# Patient Record
Sex: MALE | Race: BLACK OR AFRICAN AMERICAN | NOT HISPANIC OR LATINO | Employment: OTHER | URBAN - METROPOLITAN AREA
[De-identification: names, ages, dates, MRNs, and addresses within clinical notes are randomized per-mention and may not be internally consistent; named-entity substitution may affect disease eponyms.]

---

## 2021-06-09 PROBLEM — N17.9 ACUTE RENAL FAILURE: Status: ACTIVE | Noted: 2021-06-09

## 2021-07-05 PROBLEM — I65.23 CAROTID STENOSIS, ASYMPTOMATIC, BILATERAL: Status: ACTIVE | Noted: 2021-07-05

## 2021-07-05 PROBLEM — I10 ESSENTIAL HYPERTENSION: Status: ACTIVE | Noted: 2021-07-05

## 2021-07-05 PROBLEM — I65.23 BILATERAL CAROTID ARTERY STENOSIS: Status: ACTIVE | Noted: 2021-07-05

## 2021-07-05 PROBLEM — N18.6 ESRD (END STAGE RENAL DISEASE): Status: ACTIVE | Noted: 2021-07-05

## 2021-07-05 PROBLEM — H49.02 THIRD NERVE PALSY OF LEFT EYE: Status: ACTIVE | Noted: 2021-07-05

## 2021-07-05 PROBLEM — E11.9 TYPE 2 DIABETES MELLITUS, WITHOUT LONG-TERM CURRENT USE OF INSULIN: Status: ACTIVE | Noted: 2021-07-05

## 2021-10-11 PROBLEM — I73.9 PVD (PERIPHERAL VASCULAR DISEASE): Status: ACTIVE | Noted: 2021-10-11

## 2021-11-04 ENCOUNTER — TELEPHONE (OUTPATIENT)
Dept: CARDIOLOGY | Facility: CLINIC | Age: 66
End: 2021-11-04
Payer: MEDICARE

## 2021-11-19 ENCOUNTER — OFFICE VISIT (OUTPATIENT)
Dept: CARDIOLOGY | Facility: CLINIC | Age: 66
End: 2021-11-19
Payer: MEDICARE

## 2021-11-19 VITALS
HEIGHT: 71 IN | BODY MASS INDEX: 27.56 KG/M2 | DIASTOLIC BLOOD PRESSURE: 66 MMHG | WEIGHT: 196.88 LBS | HEART RATE: 101 BPM | SYSTOLIC BLOOD PRESSURE: 144 MMHG

## 2021-11-19 DIAGNOSIS — E11.9 TYPE 2 DIABETES MELLITUS WITHOUT COMPLICATION, WITHOUT LONG-TERM CURRENT USE OF INSULIN: ICD-10-CM

## 2021-11-19 DIAGNOSIS — I10 ESSENTIAL HYPERTENSION: ICD-10-CM

## 2021-11-19 DIAGNOSIS — I73.9 PVD (PERIPHERAL VASCULAR DISEASE): Primary | ICD-10-CM

## 2021-11-19 DIAGNOSIS — N18.6 ESRD (END STAGE RENAL DISEASE): ICD-10-CM

## 2021-11-19 DIAGNOSIS — I65.23 CAROTID STENOSIS, ASYMPTOMATIC, BILATERAL: ICD-10-CM

## 2021-11-19 PROCEDURE — 3044F HG A1C LEVEL LT 7.0%: CPT | Mod: CPTII,S$GLB,, | Performed by: PHYSICIAN ASSISTANT

## 2021-11-19 PROCEDURE — 3008F PR BODY MASS INDEX (BMI) DOCUMENTED: ICD-10-PCS | Mod: CPTII,S$GLB,, | Performed by: PHYSICIAN ASSISTANT

## 2021-11-19 PROCEDURE — 1159F PR MEDICATION LIST DOCUMENTED IN MEDICAL RECORD: ICD-10-PCS | Mod: CPTII,S$GLB,, | Performed by: PHYSICIAN ASSISTANT

## 2021-11-19 PROCEDURE — 3044F PR MOST RECENT HEMOGLOBIN A1C LEVEL <7.0%: ICD-10-PCS | Mod: CPTII,S$GLB,, | Performed by: PHYSICIAN ASSISTANT

## 2021-11-19 PROCEDURE — 3078F DIAST BP <80 MM HG: CPT | Mod: CPTII,S$GLB,, | Performed by: PHYSICIAN ASSISTANT

## 2021-11-19 PROCEDURE — 3066F NEPHROPATHY DOC TX: CPT | Mod: CPTII,S$GLB,, | Performed by: PHYSICIAN ASSISTANT

## 2021-11-19 PROCEDURE — 99999 PR PBB SHADOW E&M-EST. PATIENT-LVL III: ICD-10-PCS | Mod: PBBFAC,,, | Performed by: PHYSICIAN ASSISTANT

## 2021-11-19 PROCEDURE — 3077F SYST BP >= 140 MM HG: CPT | Mod: CPTII,S$GLB,, | Performed by: PHYSICIAN ASSISTANT

## 2021-11-19 PROCEDURE — 3077F PR MOST RECENT SYSTOLIC BLOOD PRESSURE >= 140 MM HG: ICD-10-PCS | Mod: CPTII,S$GLB,, | Performed by: PHYSICIAN ASSISTANT

## 2021-11-19 PROCEDURE — 99214 OFFICE O/P EST MOD 30 MIN: CPT | Mod: S$GLB,,, | Performed by: PHYSICIAN ASSISTANT

## 2021-11-19 PROCEDURE — 1159F MED LIST DOCD IN RCRD: CPT | Mod: CPTII,S$GLB,, | Performed by: PHYSICIAN ASSISTANT

## 2021-11-19 PROCEDURE — 99214 PR OFFICE/OUTPT VISIT, EST, LEVL IV, 30-39 MIN: ICD-10-PCS | Mod: S$GLB,,, | Performed by: PHYSICIAN ASSISTANT

## 2021-11-19 PROCEDURE — 1160F RVW MEDS BY RX/DR IN RCRD: CPT | Mod: CPTII,S$GLB,, | Performed by: PHYSICIAN ASSISTANT

## 2021-11-19 PROCEDURE — 99999 PR PBB SHADOW E&M-EST. PATIENT-LVL III: CPT | Mod: PBBFAC,,, | Performed by: PHYSICIAN ASSISTANT

## 2021-11-19 PROCEDURE — 3008F BODY MASS INDEX DOCD: CPT | Mod: CPTII,S$GLB,, | Performed by: PHYSICIAN ASSISTANT

## 2021-11-19 PROCEDURE — 3066F PR DOCUMENTATION OF TREATMENT FOR NEPHROPATHY: ICD-10-PCS | Mod: CPTII,S$GLB,, | Performed by: PHYSICIAN ASSISTANT

## 2021-11-19 PROCEDURE — 1160F PR REVIEW ALL MEDS BY PRESCRIBER/CLIN PHARMACIST DOCUMENTED: ICD-10-PCS | Mod: CPTII,S$GLB,, | Performed by: PHYSICIAN ASSISTANT

## 2021-11-19 PROCEDURE — 3078F PR MOST RECENT DIASTOLIC BLOOD PRESSURE < 80 MM HG: ICD-10-PCS | Mod: CPTII,S$GLB,, | Performed by: PHYSICIAN ASSISTANT

## 2021-11-19 RX ORDER — GABAPENTIN 300 MG/1
300 CAPSULE ORAL NIGHTLY
Qty: 90 CAPSULE | Refills: 3 | Status: SHIPPED | OUTPATIENT
Start: 2021-11-19 | End: 2022-11-19

## 2021-11-22 PROBLEM — M86.9 OSTEOMYELITIS OF GREAT TOE OF LEFT FOOT: Status: ACTIVE | Noted: 2021-11-22

## 2021-11-22 PROBLEM — Z71.89 ACP (ADVANCE CARE PLANNING): Status: ACTIVE | Noted: 2021-11-22

## 2021-11-29 PROBLEM — D63.8 ANEMIA OF CHRONIC DISEASE: Status: ACTIVE | Noted: 2021-11-29

## 2022-09-30 PROBLEM — L03.031 CELLULITIS OF GREAT TOE OF RIGHT FOOT: Status: ACTIVE | Noted: 2022-09-30

## 2022-09-30 PROBLEM — N25.81 SECONDARY HYPERPARATHYROIDISM: Status: ACTIVE | Noted: 2022-09-30

## 2022-10-17 PROBLEM — Z75.8 DISCHARGE PLANNING ISSUES: Status: ACTIVE | Noted: 2022-10-17

## 2024-07-03 ENCOUNTER — ANESTHESIA EVENT (OUTPATIENT)
Dept: CARDIOLOGY | Facility: HOSPITAL | Age: 69
End: 2024-07-03
Payer: MEDICARE

## 2024-07-03 ENCOUNTER — ANESTHESIA (OUTPATIENT)
Dept: CARDIOLOGY | Facility: HOSPITAL | Age: 69
End: 2024-07-03
Payer: MEDICARE

## 2024-07-03 ENCOUNTER — HOSPITAL ENCOUNTER (INPATIENT)
Facility: HOSPITAL | Age: 69
LOS: 5 days | Discharge: HOME HEALTH | DRG: 291 | End: 2024-07-08
Attending: FAMILY MEDICINE | Admitting: HOSPITALIST
Payer: MEDICARE

## 2024-07-03 VITALS
OXYGEN SATURATION: 99 % | HEART RATE: 62 BPM | SYSTOLIC BLOOD PRESSURE: 107 MMHG | DIASTOLIC BLOOD PRESSURE: 59 MMHG | RESPIRATION RATE: 25 BRPM

## 2024-07-03 DIAGNOSIS — I33.0 ENDOCARDITIS, BACTERIAL, ACUTE/SUBACUTE: ICD-10-CM

## 2024-07-03 DIAGNOSIS — R07.9 CHEST PAIN: ICD-10-CM

## 2024-07-03 DIAGNOSIS — I38 ENDOCARDITIS: ICD-10-CM

## 2024-07-03 DIAGNOSIS — I33.0 BACTERIAL ENDOCARDITIS: Primary | ICD-10-CM

## 2024-07-03 DIAGNOSIS — N18.6 ESRD (END STAGE RENAL DISEASE): ICD-10-CM

## 2024-07-03 DIAGNOSIS — I33.0 SBE (SUBACUTE BACTERIAL ENDOCARDITIS): ICD-10-CM

## 2024-07-03 PROBLEM — Z99.2 ANEMIA IN CHRONIC KIDNEY DISEASE, ON CHRONIC DIALYSIS: Status: ACTIVE | Noted: 2021-11-29

## 2024-07-03 PROBLEM — T87.9 BKA STUMP COMPLICATION: Status: ACTIVE | Noted: 2024-07-03

## 2024-07-03 PROBLEM — J96.01 ACUTE HYPOXIC RESPIRATORY FAILURE: Status: ACTIVE | Noted: 2024-07-03

## 2024-07-03 PROBLEM — D63.1 ANEMIA IN CHRONIC KIDNEY DISEASE, ON CHRONIC DIALYSIS: Status: ACTIVE | Noted: 2021-11-29

## 2024-07-03 PROBLEM — I50.21 ACUTE SYSTOLIC HEART FAILURE: Status: ACTIVE | Noted: 2024-07-03

## 2024-07-03 LAB
ALBUMIN SERPL BCP-MCNC: 2.9 G/DL (ref 3.5–5.2)
ALBUMIN SERPL BCP-MCNC: 3.1 G/DL (ref 3.5–5.2)
ALP SERPL-CCNC: 202 U/L (ref 55–135)
ALP SERPL-CCNC: 212 U/L (ref 55–135)
ALT SERPL W/O P-5'-P-CCNC: 27 U/L (ref 10–44)
ALT SERPL W/O P-5'-P-CCNC: 30 U/L (ref 10–44)
ANION GAP SERPL CALC-SCNC: 13 MMOL/L (ref 8–16)
ANION GAP SERPL CALC-SCNC: 16 MMOL/L (ref 8–16)
AST SERPL-CCNC: 21 U/L (ref 10–40)
AST SERPL-CCNC: 22 U/L (ref 10–40)
BASOPHILS # BLD AUTO: 0.04 K/UL (ref 0–0.2)
BASOPHILS NFR BLD: 0.7 % (ref 0–1.9)
BILIRUB SERPL-MCNC: 0.7 MG/DL (ref 0.1–1)
BILIRUB SERPL-MCNC: 0.7 MG/DL (ref 0.1–1)
BSA FOR ECHO PROCEDURE: 1.52 M2
BUN SERPL-MCNC: 42 MG/DL (ref 8–23)
BUN SERPL-MCNC: 44 MG/DL (ref 8–23)
CALCIUM SERPL-MCNC: 8.9 MG/DL (ref 8.7–10.5)
CALCIUM SERPL-MCNC: 8.9 MG/DL (ref 8.7–10.5)
CHLORIDE SERPL-SCNC: 102 MMOL/L (ref 95–110)
CHLORIDE SERPL-SCNC: 102 MMOL/L (ref 95–110)
CO2 SERPL-SCNC: 18 MMOL/L (ref 23–29)
CO2 SERPL-SCNC: 19 MMOL/L (ref 23–29)
CREAT SERPL-MCNC: 7.8 MG/DL (ref 0.5–1.4)
CREAT SERPL-MCNC: 7.9 MG/DL (ref 0.5–1.4)
DIFFERENTIAL METHOD BLD: ABNORMAL
DOP CALC MV VTI: 119.6 CM
EOSINOPHIL # BLD AUTO: 0.2 K/UL (ref 0–0.5)
EOSINOPHIL NFR BLD: 3 % (ref 0–8)
ERYTHROCYTE [DISTWIDTH] IN BLOOD BY AUTOMATED COUNT: 18.9 % (ref 11.5–14.5)
EST. GFR  (NO RACE VARIABLE): 7 ML/MIN/1.73 M^2
EST. GFR  (NO RACE VARIABLE): 7 ML/MIN/1.73 M^2
GLUCOSE SERPL-MCNC: 111 MG/DL (ref 70–110)
GLUCOSE SERPL-MCNC: 119 MG/DL (ref 70–110)
HCT VFR BLD AUTO: 36.2 % (ref 40–54)
HGB BLD-MCNC: 11.3 G/DL (ref 14–18)
IMM GRANULOCYTES # BLD AUTO: 0.02 K/UL (ref 0–0.04)
IMM GRANULOCYTES NFR BLD AUTO: 0.4 % (ref 0–0.5)
LYMPHOCYTES # BLD AUTO: 1.2 K/UL (ref 1–4.8)
LYMPHOCYTES NFR BLD: 21.5 % (ref 18–48)
MAGNESIUM SERPL-MCNC: 2.4 MG/DL (ref 1.6–2.6)
MAGNESIUM SERPL-MCNC: 2.4 MG/DL (ref 1.6–2.6)
MCH RBC QN AUTO: 30.1 PG (ref 27–31)
MCHC RBC AUTO-ENTMCNC: 31.2 G/DL (ref 32–36)
MCV RBC AUTO: 97 FL (ref 82–98)
MONOCYTES # BLD AUTO: 0.6 K/UL (ref 0.3–1)
MONOCYTES NFR BLD: 10.9 % (ref 4–15)
MR PISA EROA: 0.22 CM2
MV MEAN GRADIENT: 27 MMHG
MV PEAK GRADIENT: 41 MMHG
NEUTROPHILS # BLD AUTO: 3.6 K/UL (ref 1.8–7.7)
NEUTROPHILS NFR BLD: 63.5 % (ref 38–73)
NRBC BLD-RTO: 1 /100 WBC
PHOSPHATE SERPL-MCNC: 5.3 MG/DL (ref 2.7–4.5)
PISA MRMAX VEL: 3.18 M/S
PISA RADIUS: 0.54 CM
PISA VN NYQUIST MS: 0.39 M/S
PISA VN NYQUIST: 0.39 M/S
PLATELET # BLD AUTO: 203 K/UL (ref 150–450)
PMV BLD AUTO: 10.5 FL (ref 9.2–12.9)
POCT GLUCOSE: 103 MG/DL (ref 70–110)
POCT GLUCOSE: 122 MG/DL (ref 70–110)
POCT GLUCOSE: 124 MG/DL (ref 70–110)
POTASSIUM SERPL-SCNC: 4.9 MMOL/L (ref 3.5–5.1)
POTASSIUM SERPL-SCNC: 5 MMOL/L (ref 3.5–5.1)
PROCALCITONIN SERPL IA-MCNC: 1.43 NG/ML
PROT SERPL-MCNC: 6.9 G/DL (ref 6–8.4)
PROT SERPL-MCNC: 7 G/DL (ref 6–8.4)
RBC # BLD AUTO: 3.75 M/UL (ref 4.6–6.2)
SODIUM SERPL-SCNC: 133 MMOL/L (ref 136–145)
SODIUM SERPL-SCNC: 137 MMOL/L (ref 136–145)
VANCOMYCIN SERPL-MCNC: 14.9 UG/ML
VANCOMYCIN SERPL-MCNC: 15.6 UG/ML
WBC # BLD AUTO: 5.62 K/UL (ref 3.9–12.7)

## 2024-07-03 PROCEDURE — 5A1D70Z PERFORMANCE OF URINARY FILTRATION, INTERMITTENT, LESS THAN 6 HOURS PER DAY: ICD-10-PCS | Performed by: STUDENT IN AN ORGANIZED HEALTH CARE EDUCATION/TRAINING PROGRAM

## 2024-07-03 PROCEDURE — 25000003 PHARM REV CODE 250: Performed by: FAMILY MEDICINE

## 2024-07-03 PROCEDURE — 83735 ASSAY OF MAGNESIUM: CPT | Mod: 91 | Performed by: FAMILY MEDICINE

## 2024-07-03 PROCEDURE — 25000003 PHARM REV CODE 250: Performed by: NURSE ANESTHETIST, CERTIFIED REGISTERED

## 2024-07-03 PROCEDURE — 36415 COLL VENOUS BLD VENIPUNCTURE: CPT | Performed by: FAMILY MEDICINE

## 2024-07-03 PROCEDURE — 11000001 HC ACUTE MED/SURG PRIVATE ROOM

## 2024-07-03 PROCEDURE — 37000009 HC ANESTHESIA EA ADD 15 MINS: Performed by: INTERNAL MEDICINE

## 2024-07-03 PROCEDURE — 63600175 PHARM REV CODE 636 W HCPCS: Performed by: FAMILY MEDICINE

## 2024-07-03 PROCEDURE — 80100016 HC MAINTENANCE HEMODIALYSIS

## 2024-07-03 PROCEDURE — 84145 PROCALCITONIN (PCT): CPT | Performed by: FAMILY MEDICINE

## 2024-07-03 PROCEDURE — 80053 COMPREHEN METABOLIC PANEL: CPT | Mod: 91 | Performed by: FAMILY MEDICINE

## 2024-07-03 PROCEDURE — 80202 ASSAY OF VANCOMYCIN: CPT | Performed by: HOSPITALIST

## 2024-07-03 PROCEDURE — 80202 ASSAY OF VANCOMYCIN: CPT | Mod: 91 | Performed by: FAMILY MEDICINE

## 2024-07-03 PROCEDURE — 37000008 HC ANESTHESIA 1ST 15 MINUTES: Performed by: INTERNAL MEDICINE

## 2024-07-03 PROCEDURE — 85025 COMPLETE CBC W/AUTO DIFF WBC: CPT | Performed by: FAMILY MEDICINE

## 2024-07-03 PROCEDURE — 84100 ASSAY OF PHOSPHORUS: CPT | Performed by: FAMILY MEDICINE

## 2024-07-03 PROCEDURE — 27000221 HC OXYGEN, UP TO 24 HOURS

## 2024-07-03 PROCEDURE — 87040 BLOOD CULTURE FOR BACTERIA: CPT | Mod: 59 | Performed by: FAMILY MEDICINE

## 2024-07-03 PROCEDURE — 97162 PT EVAL MOD COMPLEX 30 MIN: CPT | Performed by: PHYSICAL THERAPIST

## 2024-07-03 PROCEDURE — 63600175 PHARM REV CODE 636 W HCPCS: Performed by: NURSE ANESTHETIST, CERTIFIED REGISTERED

## 2024-07-03 PROCEDURE — 99222 1ST HOSP IP/OBS MODERATE 55: CPT | Mod: 25,GC,, | Performed by: NURSE PRACTITIONER

## 2024-07-03 PROCEDURE — 25000003 PHARM REV CODE 250: Performed by: STUDENT IN AN ORGANIZED HEALTH CARE EDUCATION/TRAINING PROGRAM

## 2024-07-03 PROCEDURE — 97530 THERAPEUTIC ACTIVITIES: CPT | Performed by: PHYSICAL THERAPIST

## 2024-07-03 RX ORDER — ETOMIDATE 2 MG/ML
INJECTION INTRAVENOUS
Status: DISCONTINUED | OUTPATIENT
Start: 2024-07-03 | End: 2024-07-03

## 2024-07-03 RX ORDER — NALOXONE HCL 0.4 MG/ML
0.02 VIAL (ML) INJECTION
Status: DISCONTINUED | OUTPATIENT
Start: 2024-07-03 | End: 2024-07-08 | Stop reason: HOSPADM

## 2024-07-03 RX ORDER — MUPIROCIN 20 MG/G
OINTMENT TOPICAL 2 TIMES DAILY
Status: DISPENSED | OUTPATIENT
Start: 2024-07-03 | End: 2024-07-08

## 2024-07-03 RX ORDER — AMLODIPINE BESYLATE 5 MG/1
10 TABLET ORAL DAILY
Status: DISCONTINUED | OUTPATIENT
Start: 2024-07-03 | End: 2024-07-08 | Stop reason: HOSPADM

## 2024-07-03 RX ORDER — MINOXIDIL 2.5 MG/1
5 TABLET ORAL 2 TIMES DAILY
Status: DISCONTINUED | OUTPATIENT
Start: 2024-07-03 | End: 2024-07-08 | Stop reason: HOSPADM

## 2024-07-03 RX ORDER — LIDOCAINE HYDROCHLORIDE 20 MG/ML
INJECTION, SOLUTION EPIDURAL; INFILTRATION; INTRACAUDAL; PERINEURAL
Status: DISCONTINUED | OUTPATIENT
Start: 2024-07-03 | End: 2024-07-03

## 2024-07-03 RX ORDER — SODIUM CHLORIDE 9 MG/ML
INJECTION, SOLUTION INTRAVENOUS ONCE
Status: DISCONTINUED | OUTPATIENT
Start: 2024-07-03 | End: 2024-07-08 | Stop reason: HOSPADM

## 2024-07-03 RX ORDER — IBUPROFEN 200 MG
24 TABLET ORAL
Status: DISCONTINUED | OUTPATIENT
Start: 2024-07-03 | End: 2024-07-08 | Stop reason: HOSPADM

## 2024-07-03 RX ORDER — HEPARIN SODIUM 5000 [USP'U]/ML
5000 INJECTION, SOLUTION INTRAVENOUS; SUBCUTANEOUS EVERY 8 HOURS
Status: DISCONTINUED | OUTPATIENT
Start: 2024-07-03 | End: 2024-07-08 | Stop reason: HOSPADM

## 2024-07-03 RX ORDER — FAMOTIDINE 20 MG/1
40 TABLET, FILM COATED ORAL NIGHTLY
Status: DISCONTINUED | OUTPATIENT
Start: 2024-07-03 | End: 2024-07-05

## 2024-07-03 RX ORDER — CARVEDILOL 25 MG/1
25 TABLET ORAL 2 TIMES DAILY
Status: DISCONTINUED | OUTPATIENT
Start: 2024-07-03 | End: 2024-07-08 | Stop reason: HOSPADM

## 2024-07-03 RX ORDER — PROPOFOL 10 MG/ML
VIAL (ML) INTRAVENOUS
Status: DISCONTINUED | OUTPATIENT
Start: 2024-07-03 | End: 2024-07-03

## 2024-07-03 RX ORDER — GLUCAGON 1 MG
1 KIT INJECTION
Status: DISCONTINUED | OUTPATIENT
Start: 2024-07-03 | End: 2024-07-08 | Stop reason: HOSPADM

## 2024-07-03 RX ORDER — SODIUM CHLORIDE 0.9 % (FLUSH) 0.9 %
10 SYRINGE (ML) INJECTION EVERY 12 HOURS PRN
Status: DISCONTINUED | OUTPATIENT
Start: 2024-07-03 | End: 2024-07-08 | Stop reason: HOSPADM

## 2024-07-03 RX ORDER — SEVELAMER CARBONATE 800 MG/1
800 TABLET, FILM COATED ORAL
Status: DISCONTINUED | OUTPATIENT
Start: 2024-07-03 | End: 2024-07-08 | Stop reason: HOSPADM

## 2024-07-03 RX ORDER — CLOPIDOGREL BISULFATE 75 MG/1
75 TABLET ORAL DAILY
Status: DISCONTINUED | OUTPATIENT
Start: 2024-07-03 | End: 2024-07-08 | Stop reason: HOSPADM

## 2024-07-03 RX ORDER — ATORVASTATIN CALCIUM 40 MG/1
80 TABLET, FILM COATED ORAL EVERY MORNING
Status: DISCONTINUED | OUTPATIENT
Start: 2024-07-03 | End: 2024-07-08 | Stop reason: HOSPADM

## 2024-07-03 RX ORDER — ASPIRIN 81 MG/1
81 TABLET ORAL EVERY OTHER DAY
Status: DISCONTINUED | OUTPATIENT
Start: 2024-07-03 | End: 2024-07-08 | Stop reason: HOSPADM

## 2024-07-03 RX ORDER — PHENYLEPHRINE HYDROCHLORIDE 10 MG/ML
INJECTION INTRAVENOUS
Status: DISCONTINUED | OUTPATIENT
Start: 2024-07-03 | End: 2024-07-03

## 2024-07-03 RX ORDER — SODIUM CHLORIDE 9 MG/ML
INJECTION, SOLUTION INTRAVENOUS
Status: DISCONTINUED | OUTPATIENT
Start: 2024-07-03 | End: 2024-07-08 | Stop reason: HOSPADM

## 2024-07-03 RX ORDER — IBUPROFEN 200 MG
16 TABLET ORAL
Status: DISCONTINUED | OUTPATIENT
Start: 2024-07-03 | End: 2024-07-08 | Stop reason: HOSPADM

## 2024-07-03 RX ORDER — ACETAMINOPHEN 325 MG/1
650 TABLET ORAL EVERY 4 HOURS PRN
Status: DISCONTINUED | OUTPATIENT
Start: 2024-07-03 | End: 2024-07-08 | Stop reason: HOSPADM

## 2024-07-03 RX ADMIN — HEPARIN SODIUM 5000 UNITS: 5000 INJECTION INTRAVENOUS; SUBCUTANEOUS at 05:07

## 2024-07-03 RX ADMIN — PROPOFOL 10 MG: 10 INJECTION, EMULSION INTRAVENOUS at 12:07

## 2024-07-03 RX ADMIN — LIDOCAINE HYDROCHLORIDE 40 MG: 20 INJECTION, SOLUTION EPIDURAL; INFILTRATION; INTRACAUDAL; PERINEURAL at 01:07

## 2024-07-03 RX ADMIN — MUPIROCIN: 20 OINTMENT TOPICAL at 08:07

## 2024-07-03 RX ADMIN — CARVEDILOL 25 MG: 25 TABLET, FILM COATED ORAL at 08:07

## 2024-07-03 RX ADMIN — CLOPIDOGREL BISULFATE 75 MG: 75 TABLET ORAL at 08:07

## 2024-07-03 RX ADMIN — PHENYLEPHRINE HYDROCHLORIDE 200 MCG: 10 INJECTION INTRAVENOUS at 01:07

## 2024-07-03 RX ADMIN — PHENYLEPHRINE HYDROCHLORIDE 200 MCG: 10 INJECTION INTRAVENOUS at 12:07

## 2024-07-03 RX ADMIN — MINOXIDIL 5 MG: 2.5 TABLET ORAL at 09:07

## 2024-07-03 RX ADMIN — ASPIRIN 81 MG: 81 TABLET, COATED ORAL at 08:07

## 2024-07-03 RX ADMIN — LIDOCAINE HYDROCHLORIDE 100 MG: 20 INJECTION, SOLUTION EPIDURAL; INFILTRATION; INTRACAUDAL; PERINEURAL at 12:07

## 2024-07-03 RX ADMIN — MINOXIDIL 5 MG: 2.5 TABLET ORAL at 08:07

## 2024-07-03 RX ADMIN — PHENYLEPHRINE HYDROCHLORIDE 100 MCG: 10 INJECTION INTRAVENOUS at 12:07

## 2024-07-03 RX ADMIN — CARVEDILOL 25 MG: 25 TABLET, FILM COATED ORAL at 09:07

## 2024-07-03 RX ADMIN — LIDOCAINE HYDROCHLORIDE 40 MG: 20 INJECTION, SOLUTION EPIDURAL; INFILTRATION; INTRACAUDAL; PERINEURAL at 12:07

## 2024-07-03 RX ADMIN — MUPIROCIN: 20 OINTMENT TOPICAL at 09:07

## 2024-07-03 RX ADMIN — FAMOTIDINE 40 MG: 20 TABLET ORAL at 09:07

## 2024-07-03 RX ADMIN — ETOMIDATE 2 MG: 2 INJECTION, SOLUTION INTRAVENOUS at 01:07

## 2024-07-03 RX ADMIN — PHENYLEPHRINE HYDROCHLORIDE 100 MCG: 10 INJECTION INTRAVENOUS at 01:07

## 2024-07-03 RX ADMIN — ETOMIDATE 1 MG: 2 INJECTION, SOLUTION INTRAVENOUS at 12:07

## 2024-07-03 RX ADMIN — PROPOFOL 20 MG: 10 INJECTION, EMULSION INTRAVENOUS at 12:07

## 2024-07-03 RX ADMIN — AMLODIPINE BESYLATE 10 MG: 5 TABLET ORAL at 08:07

## 2024-07-03 RX ADMIN — GLYCOPYRROLATE 0.1 MG: 0.2 INJECTION, SOLUTION INTRAMUSCULAR; INTRAVITREAL at 12:07

## 2024-07-03 RX ADMIN — ETOMIDATE 1 MG: 2 INJECTION, SOLUTION INTRAVENOUS at 01:07

## 2024-07-03 RX ADMIN — SODIUM CHLORIDE: 0.9 INJECTION, SOLUTION INTRAVENOUS at 12:07

## 2024-07-03 RX ADMIN — ETOMIDATE 4 MG: 2 INJECTION, SOLUTION INTRAVENOUS at 12:07

## 2024-07-03 RX ADMIN — HEPARIN SODIUM 5000 UNITS: 5000 INJECTION INTRAVENOUS; SUBCUTANEOUS at 09:07

## 2024-07-03 NOTE — ASSESSMENT & PLAN NOTE
- normotensive on arrival  - continue current regimen  - will defer to renal for overall BP changes  - home regimen included clonidine. Per chart review, he was well controlled on current regimen. Will discontinue for now. Can consider if evidence of rebound hypertension

## 2024-07-03 NOTE — HPI
68 y.o. male with a past medical history of CAD (5 vessel CABG 2005), PVD (bilateral BKA) ESRD (HD MWF, via fistula), history of C. dif who presents as a transfer due to concern for endocarditis. He was admitted to [facility] on 6/30 for shortness of breath. As part of his work-up, found to have new systolic heart failure (EF 30-35%) with possible vegetation on the mitral valve. Admission blood cultures at OSH were positive for Acinetobacter and CONS (per transfer note). Of note, troponin and BNP were elevated on admission. Cardiology consulted at OSH and felt it was due to demand ischemia. A KURTIS was recommended, so he was transferred to [facility]. D-dimer elevated at OSH, CTPE performed without evidence of embolism.     Overall, he feels his symptoms have improved since admission. He initially felt SOB on the day of admission, that has since improved after receiving dialysis at OSH. Remains on 2L of [place]. No chest pain, no nausea, no subjective fevers/kills, no diarrhea.

## 2024-07-03 NOTE — BRIEF OP NOTE
Procedure performed:   RENAY     Indication of the procedure:  concern  infective endocarditis        Description of the procedure:     The patient had sedation using IV propofol under Anesthesiology Department.  Once the patient was sedated renay probe was passed   Down to around 35 cm multiple images were acquired in the middle serial mid gastric and deep gastric views. Spectral and  color-flow Doppler imaging were performed.  A bubble study was attempted twice  as a part of this procedure. Once all the images were acquired the RENAY probe was withdrawn without any complications.          Findings:       No definitive evidence of infective endocarditis or assessment of mitral, aortic, tricuspid, pulmonic valves   There is presence of possible calcified nodule measuring 1 x 0.5 cm on the posterior leaflet of the mitral valve associated with mild  mitral regurgitation with Ero of 0.2 cm2 and regurgitant volume of 26 mL.   No evidence of thrombus in left atrial appendage   Severely reduced ejection fraction with EF of around 30-35% with diffuse severe global hypokinesis.  Feels are foreshortened for accurate assessment of ejection fraction   Mild-to-moderate tricuspid regurgitation   Mild pulmonic insufficiency   Moderate diffuse atherosclerosis of descending aorta   Right-sided pleural effusion   No significant pericardial effusion   Trileaflet aortic valve with mild aortic stenosis and degenerative aortic valve

## 2024-07-03 NOTE — PT/OT/SLP PROGRESS
Occupational Therapy      Patient Name:  Alexis Aponte   MRN:  [id number]    2:06 PM Patient not seen today secondary to  (FLOYD in Cath Lab and then to HD per nsg). Will follow-up as able.    7/3/2024

## 2024-07-03 NOTE — CONSULTS
[facility] - Telemetry  Cardiology  Consult Note    Patient Name: Alexis Aponte  MRN: [id number]  Admission Date: 7/3/2024  Hospital Length of Stay: 0 days  Code Status: Full Code   Attending Provider: Bert Moore MD   Consulting Provider: CHARI Barger ANP  Primary Care Physician: Richie Cherry MD  Principal Problem:Bacterial endocarditis    Patient information was obtained from patient, past medical records, and ER records.     Cardiology-[facility]  Consult performed by: Viridiana Tolentino APRN, [place]  Consult ordered by: Tonny Velásquez MD  Reason for consult: KURTIS for endocarditis rule out        Subjective:     Chief Complaint:  SOB        HPI:   67yo male with CAD s/p 5V CABG 2005, PAD s/p bilateral BKA, ESRD on HD, HTN, HLP, DMII, AOCD, acute systolic heart failure and bilateral carotid stenosis who was transferred from [facility] for KURTIS. He was seen on rounds with Dr. Toth and Dr. Bach. He presented to the ER via EMS on 6/30/2024 with complaints of SOB. He reported non productive cough and diarrhea 4 days prior to admission then began feeling SOB that he reported progressively worsened. He reported it started occurring with lying flat therefore he called EMS and was brought to the ER for evaluation. He does HD M-W-F and denied any missed HD. He was emergently dialyzed and admitted with improvement of his SOB. He underwent echocardiogram than demonstrated LVEF 30-35% and 7x10mm mobile mass to MV concerning for vegetation vs degenerative process. Blood cultures positive for Acinetobacter and CONS and was on IV abx. Transferred to [facility] for KURTIS evaluation     Past Medical History:   Diagnosis Date    Anticoagulant long-term use     Carotid stenosis     Coronary artery disease     Diabetes mellitus     diet controlled    Dialysis patient     Disorder of kidney and ureter     ESRD (end stage renal disease)     GERD (gastroesophageal reflux disease)     Heart failure      Hyperlipidemia     Hypertension     PVD (peripheral vascular disease)     Sleep apnea     np cpap       Past Surgical History:   Procedure Laterality Date    5-bypass heart surgery      AORTOGRAPHY WITH EXTREMITY RUNOFF Right 10/11/2021    Procedure: AORTOGRAM, WITH EXTREMITY RUNOFF;  Surgeon: Richie Cherry MD;  Location: [facility] CATH;  Service: General;  Laterality: Right;    AORTOGRAPHY WITH EXTREMITY RUNOFF N/A 5/20/2022    Procedure: AORTOGRAM, WITH EXTREMITY RUNOFF;  Surgeon: Richie Cherry MD;  Location: [facility] CATH;  Service: General;  Laterality: N/A;    AORTOGRAPHY WITH EXTREMITY RUNOFF Left 10/3/2022    Procedure: AORTOGRAM, WITH EXTREMITY RUNOFF;  Surgeon: Abdirashid Aparicio MD;  Location: [facility] CATH;  Service: Cardiology;  Laterality: Left;    APPENDECTOMY      BELOW KNEE AMPUTATION OF LOWER EXTREMITY Left 12/4/2021    Procedure: AMPUTATION, BELOW KNEE;  Surgeon: Richie Cherry MD;  Location: [facility] OR;  Service: General;  Laterality: Left;    BELOW KNEE AMPUTATION OF LOWER EXTREMITY Right 10/12/2022    Procedure: AMPUTATION, BELOW KNEE;  Surgeon: Richie Cherry MD;  Location: [facility] OR;  Service: General;  Laterality: Right;    HERNIA REPAIR      mass on kidney      MEDIPORT REMOVAL N/A 11/27/2021    Procedure: REMOVAL, CATHETER, CENTRAL VENOUS, TUNNELED, WITH PORT;  Surgeon: Sharad Fleming MD;  Location: [facility] OR;  Service: General;  Laterality: N/A;    TOE AMPUTATION Left 11/27/2021    Procedure: AMPUTATION, TOE;  Surgeon: Kobe Snyder DPM;  Location: [facility] OR;  Service: Podiatry;  Laterality: Left;       Review of patient's allergies indicates:  No Known Allergies    No current facility-administered medications on file prior to encounter.     Current Outpatient Medications on File Prior to Encounter   Medication Sig    acetaminophen (TYLENOL) 325 MG tablet Take 1 tablet (325 mg total) by mouth every 6 (six) hours as needed for Pain.    aspirin (ECOTRIN) 81 MG EC tablet Take 1 tablet  (81 mg total) by mouth every other day.    atorvastatin (LIPITOR) 80 MG tablet Take 1 tablet (80 mg total) by mouth every morning.    calcitRIOL (ROCALTROL) 0.5 MCG Cap Take 1 capsule (0.5 mcg total) by mouth once daily.    carvediloL (COREG) 25 MG tablet Take 1 tablet (25 mg total) by mouth 2 (two) times daily.    clopidogreL (PLAVIX) 75 mg tablet Take 1 tablet (75 mg total) by mouth once daily.    famotidine (PEPCID) 40 MG tablet Take 1 tablet (40 mg total) by mouth every evening. (Patient taking differently: Take 40 mg by mouth every evening.)    gabapentin (NEURONTIN) 300 MG capsule Take 1 capsule (300 mg total) by mouth every evening. (Patient taking differently: Take 300 mg by mouth every evening.)    minoxidiL (LONITEN) 2.5 MG tablet Take 2 tablets (5 mg total) by mouth 2 (two) times daily.    sevelamer carbonate (RENVELA) 800 mg Tab Take 1 tablet (800 mg total) by mouth 3 (three) times daily with meals.    polyethylene glycol (GLYCOLAX) 17 gram PwPk Take 17 g by mouth once daily.     Family History       Problem Relation (Age of Onset)    Cancer Sister, Brother          Tobacco Use    Smoking status: Former    Smokeless tobacco: Never   Substance and Sexual Activity    Alcohol use: Yes     Comment: 1 per week    Drug use: Yes     Types: Marijuana    Sexual activity: Not on file     Review of Systems   Constitutional: Negative for chills, decreased appetite, diaphoresis, fever, malaise/fatigue, weight gain and weight loss.   Cardiovascular:  Positive for dyspnea on exertion. Negative for chest pain, claudication, irregular heartbeat, leg swelling, near-syncope, orthopnea, palpitations and paroxysmal nocturnal dyspnea.   Respiratory:  Negative for cough, shortness of breath, snoring, sputum production and wheezing.    Endocrine: Negative for cold intolerance, heat intolerance, polydipsia, polyphagia and polyuria.   Skin:  Negative for color change, dry skin, itching, nail changes and poor wound healing.    Musculoskeletal:  Negative for back pain, gout, joint pain and joint swelling.   Gastrointestinal:  Negative for bloating, abdominal pain, constipation, diarrhea, hematemesis, hematochezia, melena, nausea and vomiting.   Genitourinary:  Negative for dysuria, hematuria and nocturia.   Neurological:  Negative for dizziness, headaches, light-headedness, numbness, paresthesias and weakness.   Psychiatric/Behavioral:  Negative for altered mental status, depression and memory loss.      Objective:     Vital Signs (Most Recent):  Temp: 97.8 °F (36.6 °C) (07/03/24 0724)  Pulse: 65 (07/03/24 0724)  Resp: 18 (07/03/24 0724)  BP: (!) 164/90 (07/03/24 0724)  SpO2: 96 % (07/03/24 0749) Vital Signs (24h Range):  Temp:  [96 °F (35.6 °C)-97.9 °F (36.6 °C)] 97.8 °F (36.6 °C)  Pulse:  [65-71] 65  Resp:  [18-21] 18  SpO2:  [92 %-99 %] 96 %  BP: (128-164)/(67-90) 164/90     Weight: 68.6 kg (151 lb 3.8 oz)  Body mass index is 46.15 kg/m².    SpO2: 96 %         Intake/Output Summary (Last 24 hours) at 7/3/2024 1059  Last data filed at 7/3/2024 0903  Gross per 24 hour   Intake 0 ml   Output --   Net 0 ml       Lines/Drains/Airways       Peripheral Intravenous Line  Duration                  Hemodialysis AV Fistula -- days                     Physical Exam  Constitutional:       General: He is not in acute distress.     Appearance: He is well-developed.   Cardiovascular:      Rate and Rhythm: Normal rate and regular rhythm.      Heart sounds: No murmur heard.     No gallop.   Pulmonary:      Effort: Pulmonary effort is normal. No respiratory distress.      Breath sounds: Normal breath sounds. No wheezing.   Abdominal:      General: Bowel sounds are normal. There is no distension.      Palpations: Abdomen is soft.      Tenderness: There is no abdominal tenderness.   Skin:     General: Skin is warm and dry.   Neurological:      Mental Status: He is alert and oriented to person, place, and time.          Significant Labs: BMP:   Recent Labs    Lab 07/02/24  0523 07/03/24  0226   GLU  --  111*  119*    133*  137   K 4.3 5.0  4.9    102  102   CO2 21* 18*  19*   BUN 35* 42*  44*   CREATININE 6.22* 7.8*  7.9*   CALCIUM 8.3* 8.9  8.9   MG  --  2.4  2.4    and CBC   Recent Labs   Lab 07/03/24  0433   WBC 5.62   HGB 11.3*   HCT 36.2*          Assessment and Plan:     * Bacterial endocarditis  - positive blood cultures from OSH  - TTE with 7x10mm mobile mass to mitral valve concerning for vegetation vs degenerative changes  - repeat blood cultures pending today  - will proceed with KURTIS for further evaluation; further recs once KURTIS completed     Acute systolic heart failure  - TTE from OSH with EF 30-35% with WMA (anteroseptal/apical akinesis and inferior hypokinesis); moderate TR; moderate MR with mobile mass to MV  - on Coreg, Minoxidil as an outpatient; continued while admitted with addition of Norvasc  - presented with ADHF with improvement with volume removal via HD; recommend continuation of BB; recommend transition of Norvasc to ARB ideally would use Entresto if possible  - needs cardiology follow up as an outpatient for discussion of further ischemic evaluation as low EF is presumed new         VTE Risk Mitigation (From admission, onward)           Ordered     heparin (porcine) injection 5,000 Units  Every 8 hours         07/03/24 0109     IP VTE HIGH RISK PATIENT  Once         07/03/24 0109     Place sequential compression device  Until discontinued         07/03/24 0109                    Thank you for your consult. I will follow-up with patient. Please contact us if you have any additional questions.    Viridiana Tolentino, APRN, ANP  Cardiology   [facility] - Telemetry

## 2024-07-03 NOTE — PLAN OF CARE
[facility] (Hospital)  Initial Discharge Assessment       Primary Care Provider: Richie Cherry MD    Admission Diagnosis: Endocarditis [I38]    Admission Date: 7/3/2024  Expected Discharge Date: 7/5/2024    Consult: ID, cards, neph, wound care, & PT/OT    Payor: [employer]GD [facility] / Plan: [employer] CHOICES / Product Type: Medicare Advantage /     Extended Emergency Contact Information  Primary Emergency Contact: FRANCHESKA APONTE  Mobile Phone: [phone]  Relation: Daughter  Preferred language: English   needed? No  Secondary Emergency Contact: Kacie Waer  Mobile Phone: [phone]  Relation: Sister    Discharge Plan A: (P) Home with family  Discharge Plan B: (P) Home Health      [id number] [place] - [address]  [address]  [place] [id number]  Phone: [phone] Fax: [phone]      Initial Assessment (most recent)       Adult Discharge Assessment - 07/03/24 1416          Discharge Assessment    Assessment Type Discharge Planning Assessment (P)      Confirmed/corrected address, phone number and insurance Yes (P)      Confirmed Demographics Correct on Facesheet (P)      Source of Information patient (P)      Communicated JENNIFER with patient/caregiver Date not available/Unable to determine (P)      People in Home child(rosa maria), adult;grandchild(rosa maria) (P)    adult daughter, Francheska Aponte ([phone]), & dep GC    Do you expect to return to your current living situation? Yes (P)      Do you have help at home or someone to help you manage your care at home? Yes (P)      Prior to hospitilization cognitive status: Alert/Oriented (P)      Current cognitive status: Alert/Oriented (P)      Equipment Currently Used at Home walker, rolling;shower chair;grab bar;blood pressure machine;glucometer;wheelchair;prosthesis (P)      Readmission within 30 days? No (P)      Patient currently being followed by outpatient case management? No (P)      Do you currently  have service(s) that help you manage your care at home? No (P)      Do you take prescription medications? Yes (P)      Do you have prescription coverage? Yes (P)      Do you have any problems affording any of your prescribed medications? No (P)      Is the patient taking medications as prescribed? yes (P)      How do you get to doctors appointments? family or friend will provide (P)      Are you on dialysis? Yes (P)      Dialysis Name and Scheduled days [facility] (MWF) [facility] (P)      Do you take coumadin? No (P)      Discharge Plan A Home with family (P)      Discharge Plan B Home Health (P)      DME Needed Upon Discharge  other (see comments) (P)    tbd    Discharge Plan discussed with: Patient (P)         Physical Activity    On average, how many days per week do you engage in moderate to strenuous exercise (like a brisk walk)? 0 days (P)      On average, how many minutes do you engage in exercise at this level? 0 min (P)         Financial Resource Strain    How hard is it for you to pay for the very basics like food, housing, medical care, and heating? Not hard at all (P)         Housing Stability    In the last 12 months, was there a time when you were not able to pay the mortgage or rent on time? No (P)      At any time in the past 12 months, were you homeless or living in a shelter (including now)? No (P)         Transportation Needs    Has the lack of transportation kept you from medical appointments, meetings, work or from getting things needed for daily living? No (P)         Food Insecurity    Within the past 12 months, you worried that your food would run out before you got the money to buy more. Never true (P)      Within the past 12 months, the food you bought just didn't last and you didn't have money to get more. Never true (P)         Stress    Do you feel stress - tense, restless, nervous, or anxious, or unable to sleep at night because your mind is troubled all the time - these days? Very  much (P)         Social Isolation    How often do you feel lonely or isolated from those around you?  Often (P)    since spouse  1 year ago       Alcohol Use    Q1: How often do you have a drink containing alcohol? 4 or more times a week (P)      Q2: How many drinks containing alcohol do you have on a typical day when you are drinking? 3 or 4 (P)    2-3 whiskey shots or 4-5 beers       Utilities    In the past 12 months has the electric, gas, oil, or water company threatened to shut off services in your home? No (P)         Health Literacy    How often do you need to have someone help you when you read instructions, pamphlets, or other written material from your doctor or pharmacy? Never (P)                    0955  Patient resting quietly in bed when CM rounded. No family present. Patient was transferred from [facility], was admitted with bacterial endocarditis, & is being followed by cards, ID, neph, wound care, & PT/OT. Pox 96% on 3L O2 via NC this AM.     Patient lives with his daughter, Kartik Aponte ([phone]), & dep GC, has a history of igor BKA, has equipment to assist with ADLs including BLE prosthesis, stated that he is currently receiving HH services but does not recall the provider, & denied the need for assistance with transportation at time of discharge. Pt stated that he does not use home O2 & that his CPAP machine is broken.     CM updated patient's whiteboard with CM name & contact information.        24 1010   Rounds   Attendance Nurse ;Provider   Discharge Plan A Home with family   Why the patient remains in the hospital Requires continued medical care     1010  CM was informed by Dr Moore that the pt is not medically stable to discharge, is scheduled to have a TTE done today, & will probably need 6 wks IV abx following discharge.     1430  [facility]osp[facility] appt scheduled for the pt with Dr Richie Cherry (PCP) on 2024 at 1420. Information added to the pt's discharge  paperwork.     3415  CM was informed by the pt's daughter, Kartik, that the pt was not receiving  services prior to his hospitalization & was  scheduled to discharge to [facility] following discharge from [facility] for 6 wks IV abx before being transferred to [facility].     CM informed Kala ([phone]) w/[facility] of the pt's admission to [facility] & referral sent via [facility].       Will continue to follow.

## 2024-07-03 NOTE — PT/OT/SLP EVAL
Physical Therapy Evaluation    Patient Name:  Alexis Aponte   MRN:  [id number]    Recommendations:     Discharge Recommendations: Moderate Intensity Therapy   Discharge Equipment Recommendations: bath bench   Barriers to discharge: None    Assessment:     Alexis Aponte is a 68 y.o. male admitted with a medical diagnosis of Bacterial endocarditis.  He presents with the following impairments/functional limitations: weakness, impaired self care skills, impaired balance, decreased safety awareness, impaired endurance, impaired functional mobility, gait instability, decreased lower extremity function, impaired cardiopulmonary response to activity.    Rehab Prognosis: Fair; patient would benefit from acute skilled PT services to address these deficits and reach maximum level of function.    Recent Surgery: * No surgery found *      Plan:     During this hospitalization, patient to be seen 3 x/week to address the identified rehab impairments via gait training, therapeutic activities, therapeutic exercises, neuromuscular re-education and progress toward the following goals:    Plan of Care Expires:  08/01/24    Subjective     Chief Complaint: My legs are at home  Patient/Family Comments/goals: get my legs here  Pain/Comfort:  Pain Rating 1: 0/10    Patients cultural, spiritual, Anabaptism conflicts given the current situation: no    Living Environment:  Pt lives alone in a [facility] with no BRITNEY.  Pt's bilateral below knee prosthesis are at his house.  Prior to admission, patients level of function was Mod Ind ambulation with RW and bilateral prosthesis.  Equipment used at home: walker, rolling, wheelchair, shower chair (Bilateral Below knee prosthesis).  DME owned (not currently used): none.  Upon discharge, patient will have assistance from family.    Objective:     Communicated with nurse prior to session.  Patient found supine with bed alarm, telemetry (left arm dialysis access)  upon PT entry to room.    General  Precautions: Standard, vision impaired, fall  Orthopedic Precautions:    Braces:    Respiratory Status: Nasal cannula, flow 3 L/min    Exams:  Pt is oriented x 4.  Pt has BLE Knee and hip AROM WFL.  Pt has 5/5 BLE knee strength and 4/5 BLE hip strength.    Functional Mobility:  Pt went supine to/from sit with Mod Ind.  Pt with decreased sitting balance. Pt scooted to left/right 3' each in bed with CG/sup for balance/safety.       AM-PAC 6 CLICK MOBILITY  Total Score:12       Treatment & Education:  Educated pt to have his daughter bring his RW and bilateral prosthesis to the hospital.  Educated pt in QS, SLR and hip abd in bed.      Patient left HOB elevated with all lines intact, call button in reach, bed alarm on, and nurse notified.    GOALS:   Multidisciplinary Problems       Physical Therapy Goals          Problem: Physical Therapy    Goal Priority Disciplines Outcome Goal Variances Interventions   Physical Therapy Goal     PT, PT/OT Progressing     Description: Goals to be met by: 24     Patient will increase functional independence with mobility by performin.  Bed to/from chair with bilateral prosthesis and RW with Mod Ind  2.  Claudy bilateral prosthesis with Mod Ind  3.  Ambulate 150' with RW with bilateral prosthesis.  4.  Up in chair 2 hours  5.  Transfer bed to/from w/c with slide board with Mod Ind                         History:     Past Medical History:   Diagnosis Date    Anticoagulant long-term use     Carotid stenosis     Coronary artery disease     Diabetes mellitus     diet controlled    Dialysis patient     Disorder of kidney and ureter     ESRD (end stage renal disease)     GERD (gastroesophageal reflux disease)     Heart failure     Hyperlipidemia     Hypertension     PVD (peripheral vascular disease)     Sleep apnea     np cpap       Past Surgical History:   Procedure Laterality Date    5-bypass heart surgery      AORTOGRAPHY WITH EXTREMITY RUNOFF Right 10/11/2021    Procedure:  AORTOGRAM, WITH EXTREMITY RUNOFF;  Surgeon: Richie Cherry MD;  Location: [facility] CATH;  Service: General;  Laterality: Right;    AORTOGRAPHY WITH EXTREMITY RUNOFF N/A 5/20/2022    Procedure: AORTOGRAM, WITH EXTREMITY RUNOFF;  Surgeon: Richie Cherry MD;  Location: [facility] CATH;  Service: General;  Laterality: N/A;    AORTOGRAPHY WITH EXTREMITY RUNOFF Left 10/3/2022    Procedure: AORTOGRAM, WITH EXTREMITY RUNOFF;  Surgeon: Abdirashid Aparicio MD;  Location: [facility] CATH;  Service: Cardiology;  Laterality: Left;    APPENDECTOMY      BELOW KNEE AMPUTATION OF LOWER EXTREMITY Left 12/4/2021    Procedure: AMPUTATION, BELOW KNEE;  Surgeon: Richie Cherry MD;  Location: [facility] OR;  Service: General;  Laterality: Left;    BELOW KNEE AMPUTATION OF LOWER EXTREMITY Right 10/12/2022    Procedure: AMPUTATION, BELOW KNEE;  Surgeon: Richie Cherry MD;  Location: [facility] OR;  Service: General;  Laterality: Right;    HERNIA REPAIR      mass on kidney      MEDIPORT REMOVAL N/A 11/27/2021    Procedure: REMOVAL, CATHETER, CENTRAL VENOUS, TUNNELED, WITH PORT;  Surgeon: Sharad Fleming MD;  Location: [facility] OR;  Service: General;  Laterality: N/A;    TOE AMPUTATION Left 11/27/2021    Procedure: AMPUTATION, TOE;  Surgeon: Kobe Snyder DPM;  Location: [facility];  Service: Podiatry;  Laterality: Left;       Time Tracking:     PT Received On: 07/03/24  PT Start Time: 1000     PT Stop Time: 1400  PT Total Time (min): 240 min     Billable Minutes: Evaluation 13 and Therapeutic Activity 11      07/03/2024

## 2024-07-03 NOTE — H&P
[facility] Medicine  History & Physical    Patient Name: Alexis Aponte  MRN: [id number]  Patient Class: IP- Inpatient  Admission Date: 7/3/2024  Attending Physician: Truong Hicks,*   Primary Care Provider: Richie Cherry MD         Patient information was obtained from patient, past medical records, and ER records.     Subjective:     Principal Problem:Bacterial endocarditis    Chief Complaint: No chief complaint on file.       HPI: 68 y.o. male with a past medical history of CAD (5 vessel CABG 2005), PVD (bilateral BKA) ESRD (HD MWF, via fistula), history of C. dif who presents as a transfer due to concern for endocarditis. He was admitted to [facility] on 6/30 for shortness of breath. As part of his work-up, found to have new systolic heart failure (EF 30-35%) with possible vegetation on the mitral valve. Admission blood cultures at OSH were positive for Acinetobacter and CONS (per transfer note). Of note, troponin and BNP were elevated on admission. Cardiology consulted at OSH and felt it was due to demand ischemia. A KURTIS was recommended, so he was transferred to [facility]. D-dimer elevated at OSH, CTPE performed without evidence of embolism.     Overall, he feels his symptoms have improved since admission. He initially felt SOB on the day of admission, that has since improved after receiving dialysis at OSH. Remains on 2L of [place]. No chest pain, no nausea, no subjective fevers/kills, no diarrhea.        Past Medical History:   Diagnosis Date    Anticoagulant long-term use     Carotid stenosis     Coronary artery disease     Diabetes mellitus     diet controlled    Dialysis patient     Disorder of kidney and ureter     ESRD (end stage renal disease)     GERD (gastroesophageal reflux disease)     Heart failure     Hyperlipidemia     Hypertension     PVD (peripheral vascular disease)     Sleep apnea     np cpap       Past Surgical History:   Procedure Laterality  Date    5-bypass heart surgery      AORTOGRAPHY WITH EXTREMITY RUNOFF Right 10/11/2021    Procedure: AORTOGRAM, WITH EXTREMITY RUNOFF;  Surgeon: Richie Cherry MD;  Location: [facility] CATH;  Service: General;  Laterality: Right;    AORTOGRAPHY WITH EXTREMITY RUNOFF N/A 5/20/2022    Procedure: AORTOGRAM, WITH EXTREMITY RUNOFF;  Surgeon: Richie Cherry MD;  Location: [facility] CATH;  Service: General;  Laterality: N/A;    AORTOGRAPHY WITH EXTREMITY RUNOFF Left 10/3/2022    Procedure: AORTOGRAM, WITH EXTREMITY RUNOFF;  Surgeon: Abdirashid Aparicio MD;  Location: [facility] CATH;  Service: Cardiology;  Laterality: Left;    APPENDECTOMY      BELOW KNEE AMPUTATION OF LOWER EXTREMITY Left 12/4/2021    Procedure: AMPUTATION, BELOW KNEE;  Surgeon: Richie Cherry MD;  Location: [facility] OR;  Service: General;  Laterality: Left;    BELOW KNEE AMPUTATION OF LOWER EXTREMITY Right 10/12/2022    Procedure: AMPUTATION, BELOW KNEE;  Surgeon: Richie Cherry MD;  Location: [facility] OR;  Service: General;  Laterality: Right;    HERNIA REPAIR      mass on kidney      MEDIPORT REMOVAL N/A 11/27/2021    Procedure: REMOVAL, CATHETER, CENTRAL VENOUS, TUNNELED, WITH PORT;  Surgeon: Sharad Fleming MD;  Location: [facility] OR;  Service: General;  Laterality: N/A;    TOE AMPUTATION Left 11/27/2021    Procedure: AMPUTATION, TOE;  Surgeon: Kobe Snyder DPM;  Location: [facility] OR;  Service: Podiatry;  Laterality: Left;       Review of patient's allergies indicates:  No Known Allergies    No current facility-administered medications on file prior to encounter.     Current Outpatient Medications on File Prior to Encounter   Medication Sig    acetaminophen (TYLENOL) 325 MG tablet Take 1 tablet (325 mg total) by mouth every 6 (six) hours as needed for Pain.    aspirin (ECOTRIN) 81 MG EC tablet Take 1 tablet (81 mg total) by mouth every other day.    atorvastatin (LIPITOR) 80 MG tablet Take 1 tablet (80 mg total) by mouth every morning.    calcitRIOL  (ROCALTROL) 0.5 MCG Cap Take 1 capsule (0.5 mcg total) by mouth once daily.    carvediloL (COREG) 25 MG tablet Take 1 tablet (25 mg total) by mouth 2 (two) times daily.    clopidogreL (PLAVIX) 75 mg tablet Take 1 tablet (75 mg total) by mouth once daily.    famotidine (PEPCID) 40 MG tablet Take 1 tablet (40 mg total) by mouth every evening. (Patient taking differently: Take 40 mg by mouth every evening.)    gabapentin (NEURONTIN) 300 MG capsule Take 1 capsule (300 mg total) by mouth every evening. (Patient taking differently: Take 300 mg by mouth every evening.)    minoxidiL (LONITEN) 2.5 MG tablet Take 2 tablets (5 mg total) by mouth 2 (two) times daily.    sevelamer carbonate (RENVELA) 800 mg Tab Take 1 tablet (800 mg total) by mouth 3 (three) times daily with meals.    polyethylene glycol (GLYCOLAX) 17 gram PwPk Take 17 g by mouth once daily.     Family History       Problem Relation (Age of Onset)    Cancer Sister, Brother          Tobacco Use    Smoking status: Former    Smokeless tobacco: Never   Substance and Sexual Activity    Alcohol use: Yes     Comment: 1 per week    Drug use: Yes     Types: Marijuana    Sexual activity: Not on file     Review of Systems   Constitutional:  Positive for activity change and fatigue. Negative for fever.   HENT: Negative.     Eyes: Negative.    Respiratory:  Positive for shortness of breath.    Cardiovascular: Negative.    Gastrointestinal: Negative.    Endocrine: Negative.    Genitourinary: Negative.    Musculoskeletal: Negative.    Allergic/Immunologic: Negative.    Neurological: Negative.    Hematological: Negative.    Psychiatric/Behavioral: Negative.     All other systems reviewed and are negative.    Objective:     Vital Signs (Most Recent):  Temp: 96 °F (35.6 °C) (07/03/24 0448)  Pulse: 65 (07/03/24 0448)  Resp: 20 (07/03/24 0448)  BP: 136/81 (07/03/24 0448)  SpO2: (!) 92 % (07/03/24 0448) Vital Signs (24h Range):  Temp:  [96 °F (35.6 °C)-97.9 °F (36.6 °C)] 96 °F (35.6  °C)  Pulse:  [65-71] 65  Resp:  [20-21] 20  SpO2:  [92 %-99 %] 92 %  BP: (128-136)/(67-81) 136/81     Weight: 68.6 kg (151 lb 3.8 oz)  Body mass index is 46.15 kg/m².     Physical Exam  Constitutional:       General: He is not in acute distress.     Appearance: He is ill-appearing.   HENT:      Head: Normocephalic and atraumatic.      Mouth/Throat:      Mouth: Mucous membranes are moist.      Pharynx: Oropharynx is clear.   Eyes:      General: No scleral icterus.     Extraocular Movements: Extraocular movements intact.   Cardiovascular:      Rate and Rhythm: Normal rate and regular rhythm.      Heart sounds: No murmur heard.     No friction rub. No gallop.   Pulmonary:      Effort: Pulmonary effort is normal.      Breath sounds: Normal breath sounds.   Musculoskeletal:         General: No swelling or tenderness.      Cervical back: Normal range of motion and neck supple.   Skin:     Comments: Small ulcerations noted on bilateral BKA stump.    Neurological:      General: No focal deficit present.      Mental Status: He is oriented to person, place, and time.   Psychiatric:         Mood and Affect: Mood normal.         Behavior: Behavior normal.                Significant Labs: All pertinent labs within the past 24 hours have been reviewed.    Significant Imaging: I have reviewed all pertinent imaging results/findings within the past 24 hours.  Assessment/Plan:     * Bacterial endocarditis  - 6/30 blood cultures with with Staphylococcus species and  Acinetobacter calcoaceticus-baumannii complex  -Continue vancomycin/cefepime.   -Repeat blood cultures  -Infectious disease consulted; appreciate recommendations.  - hx of c. Dif infection per patient, will defer to ID if prophylactic oral vanc would benefit patient  - cardiology consulted, appreciate assistance  - was transferred for KURTIS. NPO for possible KURTIS on 7/3.       Acute systolic heart failure  - patient presented to OSH with acute volume overload, EF 35% on  "echo  - volume overload appears improved with dialysis  - will defer to cardiology for GDMT  - Nuclear medicine Lexiscan stress test from 7/1/2024 (per chart review), low EF "mild transient ischemia for inferoapical scar but no evidence of Lexiscan induced ischemia at this time".   - Cardiology consulted, appreciate assistance.  - repeat chest x-ray       ESRD (end stage renal disease)  - Labs pending from admission  - receives dialysis MWF  - consult to nephrology for inpatient dialysis management    Essential hypertension  - normotensive on arrival  - continue current regimen  - will defer to renal for overall BP changes  - home regimen included clonidine. Per chart review, he was well controlled on current regimen. Will discontinue for now. Can consider if evidence of rebound hypertension    BKA stump complication  - skin breakdown noted on bilateral stumps, possible diabetic ulceration.   - no obvious signs of infection  - wound care consult placed      Anemia in chronic kidney disease, on chronic dialysis  - hgb appears stable    ACP (advance care planning)  - previous documentation of his wish to be DNR.   - if decision made to pursue KURTIS, can discuss temporary revoking of code status with patient        Hyperlipidemia  - Continue statin      Coronary artery disease  - Known CAD s/p CABG. Had elevated troponins at OSH, evaluated by cardiology. [place] to be Type II NSTEMI. SPECT at OSH reportedly negative  - aspirin, statin      Type 2 diabetes mellitus, without long-term current use of insulin  - A1c 5.6 on 7/1  - glucose checks  - can start insulin or d/c checks pending glucose        VTE Risk Mitigation (From admission, onward)           Ordered     heparin (porcine) injection 5,000 Units  Every 8 hours         07/03/24 0109     IP VTE HIGH RISK PATIENT  Once         07/03/24 0109     Place sequential compression device  Until discontinued         07/03/24 0109                               Pharmacokinetic " Initial Assessment: IV Vancomycin    Assessment/Plan:    Intravenous vancomycin dosing initiated with loading dose of 1250 mg (approx 20 mg/kg) due to ESRD, given 7/2 @ 0647 at outside hospital.   Check random vanc serum levels with AM labs prior to HD.   Redose vanc when pre-HD serum concentration is less than 25 mcg/mL.   Desired empiric serum trough concentration is 15 to 20 mcg/mL  Draw vancomycin random level on 7/3 at 0400 with AM lab draw.  Pharmacy will continue to follow and monitor vancomycin.      Please contact pharmacy at extension [phone] with any questions regarding this assessment.     Thank you for the consult,   Yanci Owens       Patient brief summary:  Alexis Aponte is a 68 y.o. male initiated on antimicrobial therapy with IV Vancomycin for treatment of suspected endocarditis    Drug Allergies:   Review of patient's allergies indicates:  No Known Allergies    Actual Body Weight:   68.6 kg    Renal Function:   Estimated Creatinine Clearance: 7.9 mL/min (A) (based on SCr of 6.22 mg/dL (H)).,     Dialysis Method (if applicable):  intermittent HD- MWF    CBC (last 72 hours):  Recent Labs   Lab Result Units 07/01/24  0621   Hemoglobin A1C % 5.6       Metabolic Panel (last 72 hours):  Recent Labs   Lab Result Units 07/02/24  0523   Sodium mmol/L 139   Chloride mmol/L 104   Carbon Dioxide mmol/L 21*   Glucose Screen mg/dL 120*   Blood Urea Nitrogen mg/dL 35*   Creatinine mg/dL 6.22*   Albumin Level g/dl 3.0*   Magnesium Level MG/DL 2.3   Phosphorus Level MG/DL 4.2       Drug levels (last 3 results):  Recent Labs   Lab Result Units 07/02/24  0523   Vancomycin, Random UG/ML <1.1*       Microbiologic Results:  Microbiology Results (last 7 days)       Procedure Component Value Units Date/Time    Blood culture (site 1) [[id number]] Collected: 07/03/24 0226    Order Status: Sent Specimen: Blood from Peripheral, Hand, Right     Blood culture (site 2) [[id number]]     Order Status: Sent Specimen: Blood                   Ethan Porter MD  Department of [facility]  [facility]

## 2024-07-03 NOTE — PROVIDER TRANSFER
Outside Transfer Acceptance Note / Regional Referral Center    Referring facility: [facility]   Referring provider: KAELA GILL  Accepting facility: [facility]  Accepting provider: Michael Jay MD  Admitting provider: Tonny Velásquez  Reason for transfer: Higher Level of Care   Transfer diagnosis: Vegetation seen on TTE 7x10, Bacteremia  Transfer specialty requested: Cardiology  Transfer specialty notified: Yes  Transfer level: NUMBER 1-5: 2  Bed type requested: Telemetry  Isolation status: No active isolations   Admission class or status: IP- Inpatient      Narrative     Mr. Aponte is a 69 yo male with ESRD on HD MWF via left arm fistula, CAD s/p CABG, PAD s/p bilateral BKA who presented to ED on 6/30 for shortness of breath. Found to have new systolic heart failure with LVEF 30-35% and possible vegetation on mitral valve. Admit blood cultures are positive for Acinetobacter and CONS. Repeat blood cultures from 7/1 are NGTD. Currently receiving vancomycin and cefepime. Received HD on 7/1 with 2.5 L UF removed. Troponin and BNP were elevated on admission. Elevated troponin felt to be due to demand ischemia and not ACS. Cardiology recommends KURTIS and unable to be done there.       Objective     Vitals: Temp: 97.9  Pulse: 71  Resp: 20  BP: 128/67  O2: 96% on 2 LPM  Recent Labs: WBC 7.9, Hgb 11.2, K 4.3, CO2 21, BUN 35, Cr 6.22  Recent imaging:    Airway:   2 LPM via NC     IV access: Midline       Hemodialysis AV Fistula (Active)     Infusions: Vancomycin and Cefepime  Allergies: Review of patient's allergies indicates:  No Known Allergies   NPO: No    Anticoagulation:   Anticoagulants       None             Instructions      [facility]  Admit to Hospital Medicine  Upon patient arrival to floor, please contact Hospital Medicine on call.

## 2024-07-03 NOTE — PLAN OF CARE
Problem: Physical Therapy  Goal: Physical Therapy Goal  Description: Goals to be met by: 24     Patient will increase functional independence with mobility by performin.  Bed to/from chair with bilateral prosthesis and RW with Mod Ind  2.  Claudy bilateral prosthesis with Mod Ind  3.  Ambulate 150' with RW with bilateral prosthesis.  4.  Up in chair 2 hours  5.  Transfer bed to/from w/c with slide board with Mod Ind    Outcome: Progressing

## 2024-07-03 NOTE — HPI
68 y.o. male with a past medical history of CAD (5 vessel CABG 2005), PVD (bilateral BKA) ESRD (HD MWF, via fistula), history of C. dif who presents as a transfer due to concern for endocarditis. He was admitted to [facility] on 6/30 for shortness of breath. As part of his work-up, found to have new systolic heart failure (EF 30-35%) with possible vegetation on the mitral valve. Admission blood cultures at OSH were positive for Acinetobacter and stpah (per transfer note). Of note, troponin and BNP were elevated on admission. Cardiology consulted at OSH and felt it was due to demand ischemia. A KURTIS was recommended, so he was transferred to [facility]. Overall, he feels his symptoms have improved since admission. He initially felt SOB on the day of admission, that has since improved after receiving dialysis at OSH. Remains on 2L of N.[place]. No chest pain, no nausea, no subjective fevers/kills, and no diarrhea.     KURTIS done here was read as having calcified nodule on his mitral valve.The read states this is less likely to be infection vs. degeneration of the valve. Blood cxs here are ngtd. He is on vanco and cefepime

## 2024-07-03 NOTE — ANESTHESIA POSTPROCEDURE EVALUATION
Anesthesia Post Evaluation    Patient: Alexis Aponte    Procedure(s) Performed: Procedure(s) (LRB):  Transesophageal echo (KURTIS) intra-procedure log documentation (N/A)    Final Anesthesia Type: general      Patient location during evaluation: PACU  Patient participation: Yes- Able to Participate  Level of consciousness: awake  Post-procedure vital signs: reviewed and stable  Pain management: adequate  Airway patency: patent    PONV status at discharge: No PONV  Anesthetic complications: no      Cardiovascular status: blood pressure returned to baseline  Respiratory status: nasal cannula  Hydration status: euvolemic  Follow-up not needed.  Comments: Delayed emergence.               Vitals Value Taken Time   /67 07/03/24 1405   Pulse 62 07/03/24 1411   Resp 26 07/03/24 1411   SpO2 98 % 07/03/24 1400   Vitals shown include unfiled device data.      No case tracking events are documented in the log.      Pain/Deandre Score: Deandre Score: 9 (7/3/2024  2:00 PM)

## 2024-07-03 NOTE — SUBJECTIVE & OBJECTIVE
Past Medical History:   Diagnosis Date    Anticoagulant long-term use     Carotid stenosis     Coronary artery disease     Diabetes mellitus     diet controlled    Dialysis patient     Disorder of kidney and ureter     ESRD (end stage renal disease)     GERD (gastroesophageal reflux disease)     Heart failure     Hyperlipidemia     Hypertension     PVD (peripheral vascular disease)     Sleep apnea     np cpap       Past Surgical History:   Procedure Laterality Date    5-bypass heart surgery      AORTOGRAPHY WITH EXTREMITY RUNOFF Right 10/11/2021    Procedure: AORTOGRAM, WITH EXTREMITY RUNOFF;  Surgeon: Richie Cherry MD;  Location: [facility] CATH;  Service: General;  Laterality: Right;    AORTOGRAPHY WITH EXTREMITY RUNOFF N/A 5/20/2022    Procedure: AORTOGRAM, WITH EXTREMITY RUNOFF;  Surgeon: Richie Cherry MD;  Location: [facility];  Service: General;  Laterality: N/A;    AORTOGRAPHY WITH EXTREMITY RUNOFF Left 10/3/2022    Procedure: AORTOGRAM, WITH EXTREMITY RUNOFF;  Surgeon: Abdirashid Aparicio MD;  Location: [facility];  Service: Cardiology;  Laterality: Left;    APPENDECTOMY      BELOW KNEE AMPUTATION OF LOWER EXTREMITY Left 12/4/2021    Procedure: AMPUTATION, BELOW KNEE;  Surgeon: Richie Cherry MD;  Location: [facility] OR;  Service: General;  Laterality: Left;    BELOW KNEE AMPUTATION OF LOWER EXTREMITY Right 10/12/2022    Procedure: AMPUTATION, BELOW KNEE;  Surgeon: Richie Cherry MD;  Location: [facility] OR;  Service: General;  Laterality: Right;    HERNIA REPAIR      mass on kidney      MEDIPORT REMOVAL N/A 11/27/2021    Procedure: REMOVAL, CATHETER, CENTRAL VENOUS, TUNNELED, WITH PORT;  Surgeon: Sharad Fleming MD;  Location: [facility] OR;  Service: General;  Laterality: N/A;    TOE AMPUTATION Left 11/27/2021    Procedure: AMPUTATION, TOE;  Surgeon: Kobe Snyder DPM;  Location: [facility] OR;  Service: Podiatry;  Laterality: Left;       Review of patient's allergies indicates:  No Known Allergies    No  current facility-administered medications on file prior to encounter.     Current Outpatient Medications on File Prior to Encounter   Medication Sig    acetaminophen (TYLENOL) 325 MG tablet Take 1 tablet (325 mg total) by mouth every 6 (six) hours as needed for Pain.    aspirin (ECOTRIN) 81 MG EC tablet Take 1 tablet (81 mg total) by mouth every other day.    atorvastatin (LIPITOR) 80 MG tablet Take 1 tablet (80 mg total) by mouth every morning.    calcitRIOL (ROCALTROL) 0.5 MCG Cap Take 1 capsule (0.5 mcg total) by mouth once daily.    carvediloL (COREG) 25 MG tablet Take 1 tablet (25 mg total) by mouth 2 (two) times daily.    clopidogreL (PLAVIX) 75 mg tablet Take 1 tablet (75 mg total) by mouth once daily.    famotidine (PEPCID) 40 MG tablet Take 1 tablet (40 mg total) by mouth every evening. (Patient taking differently: Take 40 mg by mouth every evening.)    gabapentin (NEURONTIN) 300 MG capsule Take 1 capsule (300 mg total) by mouth every evening. (Patient taking differently: Take 300 mg by mouth every evening.)    minoxidiL (LONITEN) 2.5 MG tablet Take 2 tablets (5 mg total) by mouth 2 (two) times daily.    sevelamer carbonate (RENVELA) 800 mg Tab Take 1 tablet (800 mg total) by mouth 3 (three) times daily with meals.    polyethylene glycol (GLYCOLAX) 17 gram PwPk Take 17 g by mouth once daily.     Family History       Problem Relation (Age of Onset)    Cancer Sister, Brother          Tobacco Use    Smoking status: Former    Smokeless tobacco: Never   Substance and Sexual Activity    Alcohol use: Yes     Comment: 1 per week    Drug use: Yes     Types: Marijuana    Sexual activity: Not on file     Review of Systems   Constitutional:  Negative for chills, diaphoresis and fatigue.   HENT: Negative.     Eyes: Negative.    Respiratory: Negative.     Cardiovascular: Negative.    Gastrointestinal: Negative.    Endocrine: Negative.    Genitourinary: Negative.    Musculoskeletal: Negative.    Allergic/Immunologic:  Negative.    Neurological: Negative.    Hematological: Negative.    Psychiatric/Behavioral: Negative.       Objective:     Vital Signs (Most Recent):  Temp: 97.4 °F (36.3 °C) (07/03/24 0045)  Pulse: 65 (07/03/24 0045)  Resp: (!) 21 (07/03/24 0045)  BP: 135/80 (07/03/24 0045)  SpO2: 99 % (07/03/24 0045) Vital Signs (24h Range):  Temp:  [97.4 °F (36.3 °C)-97.9 °F (36.6 °C)] 97.4 °F (36.3 °C)  Pulse:  [65-71] 65  Resp:  [20-21] 21  SpO2:  [96 %-99 %] 99 %  BP: (128-135)/(67-80) 135/80     Weight: 68.6 kg (151 lb 3.8 oz)  Body mass index is 46.15 kg/m².     Physical Exam  Constitutional:       Appearance: Normal appearance.   HENT:      Head: Normocephalic and atraumatic.      Nose: Nose normal. No congestion.      Mouth/Throat:      Mouth: Mucous membranes are moist.      Pharynx: Oropharynx is clear. No oropharyngeal exudate.   Eyes:      General: No scleral icterus.     Extraocular Movements: Extraocular movements intact.   Cardiovascular:      Rate and Rhythm: Normal rate and regular rhythm.      Heart sounds: No murmur heard.     No friction rub.   Pulmonary:      Effort: Pulmonary effort is normal. No respiratory distress.      Breath sounds: Normal breath sounds. No stridor. No wheezing.   Abdominal:      General: Abdomen is flat. Bowel sounds are normal.      Palpations: Abdomen is soft.   Musculoskeletal:      Cervical back: Normal range of motion and neck supple.      Comments: LUE fistula present  S/p Bilateral bka  Skin breakdown noted on bilateral stumps   Skin:     General: Skin is warm and dry.   Neurological:      General: No focal deficit present.      Mental Status: He is alert and oriented to person, place, and time.   Psychiatric:         Mood and Affect: Mood normal.         Behavior: Behavior normal.                Significant Labs: All pertinent labs within the past 24 hours have been reviewed.    Significant Imaging: I have reviewed all pertinent imaging results/findings within the past 24  hours.

## 2024-07-03 NOTE — PROGRESS NOTES
Pharmacokinetic Initial Assessment: IV Vancomycin    Assessment/Plan:    Intravenous vancomycin dosing initiated with loading dose of 1250 mg (approx 20 mg/kg) due to ESRD, given 7/2 @ 0647 at outside hospital.   Check random vanc serum levels with AM labs prior to HD.   Redose vanc when pre-HD serum concentration is less than 25 mcg/mL.   Desired empiric serum trough concentration is 15 to 20 mcg/mL  Draw vancomycin random level on 7/3 at 0400 with AM lab draw.  Pharmacy will continue to follow and monitor vancomycin.      Please contact pharmacy at extension [phone] with any questions regarding this assessment.     Thank you for the consult,   Yanci Owens       Patient brief summary:  Alexis Aponte is a 68 y.o. male initiated on antimicrobial therapy with IV Vancomycin for treatment of suspected endocarditis    Drug Allergies:   Review of patient's allergies indicates:  No Known Allergies    Actual Body Weight:   68.6 kg    Renal Function:   Estimated Creatinine Clearance: 7.9 mL/min (A) (based on SCr of 6.22 mg/dL (H)).,     Dialysis Method (if applicable):  intermittent HD- MWF    CBC (last 72 hours):  Recent Labs   Lab Result Units 07/01/24  0621   Hemoglobin A1C % 5.6       Metabolic Panel (last 72 hours):  Recent Labs   Lab Result Units 07/02/24  0523   Sodium mmol/L 139   Chloride mmol/L 104   Carbon Dioxide mmol/L 21*   Glucose Screen mg/dL 120*   Blood Urea Nitrogen mg/dL 35*   Creatinine mg/dL 6.22*   Albumin Level g/dl 3.0*   Magnesium Level MG/DL 2.3   Phosphorus Level MG/DL 4.2       Drug levels (last 3 results):  Recent Labs   Lab Result Units 07/02/24  0523   Vancomycin, Random UG/ML <1.1*       Microbiologic Results:  Microbiology Results (last 7 days)       Procedure Component Value Units Date/Time    Blood culture (site 1) [[id number]] Collected: 07/03/24 0226    Order Status: Sent Specimen: Blood from Peripheral, Hand, Right     Blood culture (site 2) [[id number]]     Order Status: Sent  Specimen: Blood

## 2024-07-03 NOTE — ASSESSMENT & PLAN NOTE
- Known CAD s/p CABG. Had elevated troponins at OSH, evaluated by cardiology. [place] to be Type II NSTEMI. SPECT at OSH reportedly negative    Plan:  - aspirin, statin

## 2024-07-03 NOTE — PLAN OF CARE
07/03/24 0158   Admission   Initial VN Admission Questions Complete   Communication Issues? None   Shift   Virtual Nurse - Rounding Complete   Pain Management Interventions relaxation techniques promoted;quiet environment facilitated   Virtual Nurse - Patient Verbalized Approval Of VN Rounding;Camera Use   Type of Frequent Check   Type Telemetry Monitoring   Safety/Activity   Patient Rounds bed in low position;bed wheels locked;call light in patient/parent reach;clutter free environment maintained;ID band on;visualized patient;placement of personal items at bedside   Safety Promotion/Fall Prevention assistive device/personal item within reach;bed alarm set;instructed to call staff for mobility;room near unit station;nonskid shoes/socks when out of bed;side rails raised x 2;medications reviewed   Safety Precautions emergency equipment at bedside   Safety Bands on Patient Fall Risk Band   Activity Management Rolling - L1   Elimination Assistance urinal within reach   Positioning   Body Position position changed independently   Head of Bed (HOB) Positioning HOB elevated   Positioning/Transfer Devices pillows;in use        VIRTUAL NURSE: Pt arrived to unit. Permission received per patient to turn camera to view patient. VIP model explained; patient informed this VN will be working with bedside nurse and the rest of the care team. Plan of care reviewed with patient.  Educated patient on VTE, fall risk and fall risk precautions in place. Call light within reach, side rails up x2. Admission questions completed. Patient instucted to ask staff for assistance. Patient verbalized complete understanding. Patient denies complaints or any needs at this time. Will continue to be available and intervene as needed.

## 2024-07-03 NOTE — PLAN OF CARE
Problem: Adult Inpatient Plan of Care  Goal: Plan of Care Review  Outcome: Progressing     Problem: Diabetes Comorbidity  Goal: Blood Glucose Level Within Targeted Range  Outcome: Progressing     Problem: Infection  Goal: Absence of Infection Signs and Symptoms  Outcome: Progressing     Problem: Skin Injury Risk Increased  Goal: Skin Health and Integrity  Outcome: Progressing

## 2024-07-03 NOTE — SUBJECTIVE & OBJECTIVE
Past Medical History:   Diagnosis Date    Anticoagulant long-term use     Carotid stenosis     Coronary artery disease     Diabetes mellitus     diet controlled    Dialysis patient     Disorder of kidney and ureter     ESRD (end stage renal disease)     GERD (gastroesophageal reflux disease)     Heart failure     Hyperlipidemia     Hypertension     PVD (peripheral vascular disease)     Sleep apnea     np cpap       Past Surgical History:   Procedure Laterality Date    5-bypass heart surgery      AORTOGRAPHY WITH EXTREMITY RUNOFF Right 10/11/2021    Procedure: AORTOGRAM, WITH EXTREMITY RUNOFF;  Surgeon: Richie Cherry MD;  Location: [facility];  Service: General;  Laterality: Right;    AORTOGRAPHY WITH EXTREMITY RUNOFF N/A 5/20/2022    Procedure: AORTOGRAM, WITH EXTREMITY RUNOFF;  Surgeon: Richie Cherry MD;  Location: [facility];  Service: General;  Laterality: N/A;    AORTOGRAPHY WITH EXTREMITY RUNOFF Left 10/3/2022    Procedure: AORTOGRAM, WITH EXTREMITY RUNOFF;  Surgeon: Abdirashid Aparicio MD;  Location: [facility];  Service: Cardiology;  Laterality: Left;    APPENDECTOMY      BELOW KNEE AMPUTATION OF LOWER EXTREMITY Left 12/4/2021    Procedure: AMPUTATION, BELOW KNEE;  Surgeon: Richie Cherry MD;  Location: [facility] OR;  Service: General;  Laterality: Left;    BELOW KNEE AMPUTATION OF LOWER EXTREMITY Right 10/12/2022    Procedure: AMPUTATION, BELOW KNEE;  Surgeon: Richie Cherry MD;  Location: [facility] OR;  Service: General;  Laterality: Right;    HERNIA REPAIR      mass on kidney      MEDIPORT REMOVAL N/A 11/27/2021    Procedure: REMOVAL, CATHETER, CENTRAL VENOUS, TUNNELED, WITH PORT;  Surgeon: Sharad Fleming MD;  Location: [facility] OR;  Service: General;  Laterality: N/A;    TOE AMPUTATION Left 11/27/2021    Procedure: AMPUTATION, TOE;  Surgeon: Kobe Snyder DPM;  Location: [facility] OR;  Service: Podiatry;  Laterality: Left;       Review of patient's allergies indicates:  No Known  "Allergies    Medications:  Medications Prior to Admission   Medication Sig    acetaminophen (TYLENOL) 325 MG tablet Take 1 tablet (325 mg total) by mouth every 6 (six) hours as needed for Pain.    aspirin (ECOTRIN) 81 MG EC tablet Take 1 tablet (81 mg total) by mouth every other day.    atorvastatin (LIPITOR) 80 MG tablet Take 1 tablet (80 mg total) by mouth every morning.    calcitRIOL (ROCALTROL) 0.5 MCG Cap Take 1 capsule (0.5 mcg total) by mouth once daily.    carvediloL (COREG) 25 MG tablet Take 1 tablet (25 mg total) by mouth 2 (two) times daily.    clopidogreL (PLAVIX) 75 mg tablet Take 1 tablet (75 mg total) by mouth once daily.    famotidine (PEPCID) 40 MG tablet Take 1 tablet (40 mg total) by mouth every evening. (Patient taking differently: Take 40 mg by mouth every evening.)    gabapentin (NEURONTIN) 300 MG capsule Take 1 capsule (300 mg total) by mouth every evening. (Patient taking differently: Take 300 mg by mouth every evening.)    minoxidiL (LONITEN) 2.5 MG tablet Take 2 tablets (5 mg total) by mouth 2 (two) times daily.    sevelamer carbonate (RENVELA) 800 mg Tab Take 1 tablet (800 mg total) by mouth 3 (three) times daily with meals.    polyethylene glycol (GLYCOLAX) 17 gram PwPk Take 17 g by mouth once daily.     Antibiotics (From admission, onward)      Start     Stop Route Frequency Ordered    07/03/24 1700  vancomycin (VANCOCIN) 1,000 mg in D5W 250 mL IVPB (Vial-Mate)  (vancomycin IVPB (PEDS and ADULTS))         -- IV Every Mon, Wed, Fri 07/03/24 1018    07/03/24 1500  ceFEPIme (MAXIPIME) 2 g in D5W 100 mL IVPB (MB+)         -- IV Every 24 hours (non-standard times) 07/03/24 0220    07/03/24 0900  mupirocin 2 % ointment         07/08/24 0859 Nasl 2 times daily 07/03/24 0113    07/03/24 0208  vancomycin - pharmacy to dose  (vancomycin IVPB (PEDS and ADULTS))        Placed in "And" Linked Group    -- IV pharmacy to manage frequency 07/03/24 0109          Antifungals (From admission, onward)      " None          Antivirals (From admission, onward)      None             Immunization History   Administered Date(s) Administered    PPD Test 06/11/2021, 12/20/2021, 11/04/2022       Family History       Problem Relation (Age of Onset)    Cancer Sister, Brother          Social History     Socioeconomic History    Marital status:    Tobacco Use    Smoking status: Former    Smokeless tobacco: Never   Substance and Sexual Activity    Alcohol use: Yes     Comment: 1 per week    Drug use: Yes     Types: Marijuana     Social Determinants of Health     Financial Resource Strain: Low Risk  (6/30/2024)    [facility] from [facility] Subsidiaries and Affiliates    Overall Financial Resource Strain (CARDIA)     Difficulty of Paying Living Expenses: Not hard at all   Food Insecurity: No Food Insecurity (6/30/2024)    [facility]    Hunger Vital Sign     Worried About Running Out of Food in the Last Year: Never true     Ran Out of Food in the Last Year: Never true   Transportation Needs: No Transportation Needs (6/30/2024)    [facility]    PRAPARE - Transportation     Lack of Transportation (Medical): No     Lack of Transportation (Non-Medical): No   Stress: No Stress Concern Present (6/30/2024)    [facility]    [employer] [place] [employer] - Occupational Stress Questionnaire     Feeling of Stress : Only a little     Review of Systems   Constitutional:  Positive for activity change and fatigue. Negative for fever.   HENT: Negative.     Eyes: Negative.    Respiratory:  Positive for shortness of breath.    Cardiovascular: Negative.    Gastrointestinal: Negative.    Endocrine: Negative.    Genitourinary:  Negative.    Musculoskeletal: Negative.    Allergic/Immunologic: Negative.    Neurological: Negative.    Hematological: Negative.    Psychiatric/Behavioral: Negative.     All other systems reviewed and are negative.    Objective:     Vital Signs (Most Recent):  Temp: 97.4 °F (36.3 °C) (07/03/24 1143)  Pulse: 62 (07/03/24 1143)  Resp: 20 (07/03/24 1143)  BP: (!) 145/79 (07/03/24 1143)  SpO2: (!) 78 % (07/03/24 1143) Vital Signs (24h Range):  Temp:  [96 °F (35.6 °C)-97.9 °F (36.6 °C)] 97.4 °F (36.3 °C)  Pulse:  [62-71] 62  Resp:  [18-21] 20  SpO2:  [78 %-99 %] 78 %  BP: (128-164)/(67-90) 145/79     Weight: 68.6 kg (151 lb 3.8 oz)  Body mass index is 46.15 kg/m².    Estimated Creatinine Clearance: 6.2 mL/min (A) (based on SCr of 7.9 mg/dL (H)).     Physical Exam  Constitutional:       General: He is not in acute distress.     Appearance: He is ill-appearing.   HENT:      Head: Normocephalic and atraumatic.      Mouth/Throat:      Mouth: Mucous membranes are moist.      Pharynx: Oropharynx is clear.   Eyes:      General: No scleral icterus.     Extraocular Movements: Extraocular movements intact.   Cardiovascular:      Rate and Rhythm: Normal rate and regular rhythm.      Heart sounds: No murmur heard.     No friction rub. No gallop.   Pulmonary:      Effort: Pulmonary effort is normal.      Breath sounds: Normal breath sounds.   Musculoskeletal:         General: No swelling or tenderness.      Cervical back: Normal range of motion and neck supple.   Skin:     Comments: Small ulcerations noted on bilateral BKA stump.    Neurological:      General: No focal deficit present.      Mental Status: He is oriented to person, place, and time.   Psychiatric:         Mood and Affect: Mood normal.         Behavior: Behavior normal.          Significant Labs: All pertinent labs within the past 24 hours have been reviewed.    Significant Imaging: I have reviewed all pertinent imaging results/findings within the past 24 hours.

## 2024-07-03 NOTE — ASSESSMENT & PLAN NOTE
- TTE from OSH with EF 30-35% with WMA (anteroseptal/apical akinesis and inferior hypokinesis); moderate TR; moderate MR with mobile mass to MV  - on Coreg, Minoxidil as an outpatient; continued while admitted with addition of Norvasc  - presented with ADHF with improvement with volume removal via HD; recommend continuation of BB; recommend transition of Norvasc to ARB ideally would use Entresto if possible  - needs cardiology follow up as an outpatient for discussion of further ischemic evaluation as low EF is presumed new

## 2024-07-03 NOTE — SUBJECTIVE & OBJECTIVE
Past Medical History:   Diagnosis Date    Anticoagulant long-term use     Carotid stenosis     Coronary artery disease     Diabetes mellitus     diet controlled    Dialysis patient     Disorder of kidney and ureter     ESRD (end stage renal disease)     GERD (gastroesophageal reflux disease)     Heart failure     Hyperlipidemia     Hypertension     PVD (peripheral vascular disease)     Sleep apnea     np cpap       Past Surgical History:   Procedure Laterality Date    5-bypass heart surgery      AORTOGRAPHY WITH EXTREMITY RUNOFF Right 10/11/2021    Procedure: AORTOGRAM, WITH EXTREMITY RUNOFF;  Surgeon: Richie Cherry MD;  Location: [facility] CATH;  Service: General;  Laterality: Right;    AORTOGRAPHY WITH EXTREMITY RUNOFF N/A 5/20/2022    Procedure: AORTOGRAM, WITH EXTREMITY RUNOFF;  Surgeon: Richie Cherry MD;  Location: [facility] CATH;  Service: General;  Laterality: N/A;    AORTOGRAPHY WITH EXTREMITY RUNOFF Left 10/3/2022    Procedure: AORTOGRAM, WITH EXTREMITY RUNOFF;  Surgeon: Abdirashid Aparicio MD;  Location: [facility] CATH;  Service: Cardiology;  Laterality: Left;    APPENDECTOMY      BELOW KNEE AMPUTATION OF LOWER EXTREMITY Left 12/4/2021    Procedure: AMPUTATION, BELOW KNEE;  Surgeon: Richie Cherry MD;  Location: [facility] OR;  Service: General;  Laterality: Left;    BELOW KNEE AMPUTATION OF LOWER EXTREMITY Right 10/12/2022    Procedure: AMPUTATION, BELOW KNEE;  Surgeon: Richie Cherry MD;  Location: [facility] OR;  Service: General;  Laterality: Right;    HERNIA REPAIR      mass on kidney      MEDIPORT REMOVAL N/A 11/27/2021    Procedure: REMOVAL, CATHETER, CENTRAL VENOUS, TUNNELED, WITH PORT;  Surgeon: Sharad Fleming MD;  Location: [facility] OR;  Service: General;  Laterality: N/A;    TOE AMPUTATION Left 11/27/2021    Procedure: AMPUTATION, TOE;  Surgeon: Kobe Snyder DPM;  Location: [facility] OR;  Service: Podiatry;  Laterality: Left;       Review of patient's allergies indicates:  No Known Allergies    No  current facility-administered medications on file prior to encounter.     Current Outpatient Medications on File Prior to Encounter   Medication Sig    acetaminophen (TYLENOL) 325 MG tablet Take 1 tablet (325 mg total) by mouth every 6 (six) hours as needed for Pain.    aspirin (ECOTRIN) 81 MG EC tablet Take 1 tablet (81 mg total) by mouth every other day.    atorvastatin (LIPITOR) 80 MG tablet Take 1 tablet (80 mg total) by mouth every morning.    calcitRIOL (ROCALTROL) 0.5 MCG Cap Take 1 capsule (0.5 mcg total) by mouth once daily.    carvediloL (COREG) 25 MG tablet Take 1 tablet (25 mg total) by mouth 2 (two) times daily.    clopidogreL (PLAVIX) 75 mg tablet Take 1 tablet (75 mg total) by mouth once daily.    famotidine (PEPCID) 40 MG tablet Take 1 tablet (40 mg total) by mouth every evening. (Patient taking differently: Take 40 mg by mouth every evening.)    gabapentin (NEURONTIN) 300 MG capsule Take 1 capsule (300 mg total) by mouth every evening. (Patient taking differently: Take 300 mg by mouth every evening.)    minoxidiL (LONITEN) 2.5 MG tablet Take 2 tablets (5 mg total) by mouth 2 (two) times daily.    sevelamer carbonate (RENVELA) 800 mg Tab Take 1 tablet (800 mg total) by mouth 3 (three) times daily with meals.    polyethylene glycol (GLYCOLAX) 17 gram PwPk Take 17 g by mouth once daily.     Family History       Problem Relation (Age of Onset)    Cancer Sister, Brother          Tobacco Use    Smoking status: Former    Smokeless tobacco: Never   Substance and Sexual Activity    Alcohol use: Yes     Comment: 1 per week    Drug use: Yes     Types: Marijuana    Sexual activity: Not on file     Review of Systems   Constitutional: Negative for chills, decreased appetite, diaphoresis, fever, malaise/fatigue, weight gain and weight loss.   Cardiovascular:  Positive for dyspnea on exertion. Negative for chest pain, claudication, irregular heartbeat, leg swelling, near-syncope, orthopnea, palpitations and  paroxysmal nocturnal dyspnea.   Respiratory:  Negative for cough, shortness of breath, snoring, sputum production and wheezing.    Endocrine: Negative for cold intolerance, heat intolerance, polydipsia, polyphagia and polyuria.   Skin:  Negative for color change, dry skin, itching, nail changes and poor wound healing.   Musculoskeletal:  Negative for back pain, gout, joint pain and joint swelling.   Gastrointestinal:  Negative for bloating, abdominal pain, constipation, diarrhea, hematemesis, hematochezia, melena, nausea and vomiting.   Genitourinary:  Negative for dysuria, hematuria and nocturia.   Neurological:  Negative for dizziness, headaches, light-headedness, numbness, paresthesias and weakness.   Psychiatric/Behavioral:  Negative for altered mental status, depression and memory loss.      Objective:     Vital Signs (Most Recent):  Temp: 97.8 °F (36.6 °C) (07/03/24 0724)  Pulse: 65 (07/03/24 0724)  Resp: 18 (07/03/24 0724)  BP: (!) 164/90 (07/03/24 0724)  SpO2: 96 % (07/03/24 0749) Vital Signs (24h Range):  Temp:  [96 °F (35.6 °C)-97.9 °F (36.6 °C)] 97.8 °F (36.6 °C)  Pulse:  [65-71] 65  Resp:  [18-21] 18  SpO2:  [92 %-99 %] 96 %  BP: (128-164)/(67-90) 164/90     Weight: 68.6 kg (151 lb 3.8 oz)  Body mass index is 46.15 kg/m².    SpO2: 96 %         Intake/Output Summary (Last 24 hours) at 7/3/2024 1059  Last data filed at 7/3/2024 0903  Gross per 24 hour   Intake 0 ml   Output --   Net 0 ml       Lines/Drains/Airways       Peripheral Intravenous Line  Duration                  Hemodialysis AV Fistula -- days                     Physical Exam  Constitutional:       General: He is not in acute distress.     Appearance: He is well-developed.   Cardiovascular:      Rate and Rhythm: Normal rate and regular rhythm.      Heart sounds: No murmur heard.     No gallop.   Pulmonary:      Effort: Pulmonary effort is normal. No respiratory distress.      Breath sounds: Normal breath sounds. No wheezing.   Abdominal:       General: Bowel sounds are normal. There is no distension.      Palpations: Abdomen is soft.      Tenderness: There is no abdominal tenderness.   Skin:     General: Skin is warm and dry.   Neurological:      Mental Status: He is alert and oriented to person, place, and time.          Significant Labs: BMP:   Recent Labs   Lab 07/02/24  0523 07/03/24  0226   GLU  --  111*  119*    133*  137   K 4.3 5.0  4.9    102  102   CO2 21* 18*  19*   BUN 35* 42*  44*   CREATININE 6.22* 7.8*  7.9*   CALCIUM 8.3* 8.9  8.9   MG  --  2.4  2.4    and CBC   Recent Labs   Lab 07/03/24  0433   WBC 5.62   HGB 11.3*   HCT 36.2*

## 2024-07-03 NOTE — CONSULTS
Wound care consult received for BLE stump- pt is in cath lab- spoke with nurse who reports dry intact scabs to stump and intact sacrum/buttocks with no redness. Recommend nurse to continue with routine skin care and keep stump wounds dry and open to air.

## 2024-07-03 NOTE — ASSESSMENT & PLAN NOTE
- 6/30 blood cultures with with Staphylococcus species and  Acinetobacter calcoaceticus-baumannii complex  -Continue vancomycin/cefepime.   -Repeat blood cultures  -Infectious disease consulted; appreciate recommendations.  - hx of c. Dif infection per patient, will defer to ID if prophylactic oral vanc would benefit patient  - cardiology consulted, appreciate assistance  - was transferred for KURTIS. NPO for possible KURTIS on 7/3.

## 2024-07-03 NOTE — ANESTHESIA PREPROCEDURE EVALUATION
[facility]  Anesthesia Pre-Operative Evaluation         Patient Name: Alexis Aponte  YOB: 1955  MRN: [id number]    SUBJECTIVE:     07/03/2024    Procedure(s) (LRB):  Transesophageal echo (KURTIS) intra-procedure log documentation (N/A)    Alexis Aponte is a 68 y.o. male here for Procedure(s) (LRB):  Transesophageal echo (KURTIS) intra-procedure log documentation (N/A)    Drips:     Patient Active Problem List   Diagnosis    Acute renal failure    Third nerve palsy of left eye    ESRD (end stage renal disease)    Essential hypertension    Type 2 diabetes mellitus, without long-term current use of insulin    Carotid stenosis, asymptomatic, bilateral    PVD (peripheral vascular disease)    Osteomyelitis of great toe of left foot    Coronary artery disease    Hyperlipidemia    ACP (advance care planning)    Anemia in chronic kidney disease, on chronic dialysis    Cellulitis of great toe of right foot    Secondary hyperparathyroidism    Discharge planning issues    Bacterial endocarditis    Acute systolic heart failure    BKA stump complication    Acute hypoxic respiratory failure       Review of patient's allergies indicates:  No Known Allergies    No current facility-administered medications on file prior to encounter.     Current Outpatient Medications on File Prior to Encounter   Medication Sig Dispense Refill    acetaminophen (TYLENOL) 325 MG tablet Take 1 tablet (325 mg total) by mouth every 6 (six) hours as needed for Pain.      aspirin (ECOTRIN) 81 MG EC tablet Take 1 tablet (81 mg total) by mouth every other day. 30 tablet 0    atorvastatin (LIPITOR) 80 MG tablet Take 1 tablet (80 mg total) by mouth every morning. 30 tablet 0    calcitRIOL (ROCALTROL) 0.5 MCG Cap Take 1 capsule (0.5 mcg total) by mouth once daily.      carvediloL (COREG) 25 MG tablet Take 1 tablet (25 mg total) by  mouth 2 (two) times daily. 60 tablet 0    clopidogreL (PLAVIX) 75 mg tablet Take 1 tablet (75 mg total) by mouth once daily.      famotidine (PEPCID) 40 MG tablet Take 1 tablet (40 mg total) by mouth every evening. (Patient taking differently: Take 40 mg by mouth every evening.)      gabapentin (NEURONTIN) 300 MG capsule Take 1 capsule (300 mg total) by mouth every evening. (Patient taking differently: Take 300 mg by mouth every evening.) 90 capsule 3    minoxidiL (LONITEN) 2.5 MG tablet Take 2 tablets (5 mg total) by mouth 2 (two) times daily.      sevelamer carbonate (RENVELA) 800 mg Tab Take 1 tablet (800 mg total) by mouth 3 (three) times daily with meals.      polyethylene glycol (GLYCOLAX) 17 gram PwPk Take 17 g by mouth once daily.         Past Surgical History:   Procedure Laterality Date    5-bypass heart surgery      AORTOGRAPHY WITH EXTREMITY RUNOFF Right 10/11/2021    Procedure: AORTOGRAM, WITH EXTREMITY RUNOFF;  Surgeon: Richie Cherry MD;  Location: [facility] CATH;  Service: General;  Laterality: Right;    AORTOGRAPHY WITH EXTREMITY RUNOFF N/A 5/20/2022    Procedure: AORTOGRAM, WITH EXTREMITY RUNOFF;  Surgeon: Richie Cherry MD;  [facility];  Service: General;  Laterality: N/A;    AORTOGRAPHY WITH EXTREMITY RUNOFF Left 10/3/2022    Procedure: AORTOGRAM, WITH EXTREMITY RUNOFF;  Surgeon: Abdirashid Aparicio MD;  Location: [facility] CATH;  Service: Cardiology;  Laterality: Left;    APPENDECTOMY      BELOW KNEE AMPUTATION OF LOWER EXTREMITY Left 12/4/2021    Procedure: AMPUTATION, BELOW KNEE;  Surgeon: Richie Cherry MD;  Location: [facility] OR;  Service: General;  Laterality: Left;    BELOW KNEE AMPUTATION OF LOWER EXTREMITY Right 10/12/2022    Procedure: AMPUTATION, BELOW KNEE;  Surgeon: Richie Cherry MD;  Location: [facility] OR;  Service: General;  Laterality: Right;    HERNIA REPAIR      mass on kidney      MEDIPORT REMOVAL N/A 11/27/2021    Procedure: REMOVAL, CATHETER, CENTRAL VENOUS,  TUNNELED, WITH PORT;  Surgeon: Sharad Fleming MD;  Location: [facility] OR;  Service: General;  Laterality: N/A;    TOE AMPUTATION Left 11/27/2021    Procedure: AMPUTATION, TOE;  Surgeon: Kobe Snyder DPM;  Location: [facility] OR;  Service: Podiatry;  Laterality: Left;       Social History     Socioeconomic History    Marital status:    Tobacco Use    Smoking status: Former    Smokeless tobacco: Never   Substance and Sexual Activity    Alcohol use: Yes     Comment: 1 per week    Drug use: Yes     Types: Marijuana     Social Determinants of Health     Financial Resource Strain: Low Risk  (6/30/2024)    [facility]    Overall Financial Resource Strain (CARDIA)     Difficulty of Paying Living Expenses: Not hard at all   Food Insecurity: No Food Insecurity (6/30/2024)    [facility]    Hunger Vital Sign     Worried About Running Out of Food in the Last Year: Never true     Ran Out of Food in the Last Year: Never true   Transportation Needs: No Transportation Needs (6/30/2024)    [facility]    PRAPARE - Transportation     Lack of Transportation (Medical): No     Lack of Transportation (Non-Medical): No   Stress: No Stress Concern Present (6/30/2024)    [facility]    [facility] - Occupational Stress Questionnaire     Feeling of Stress : Only a little         OBJECTIVE:     Vital Signs Range (Last 24H):  Temp:  [35.6 °C (96 °F)-36.6 °C (97.9 °F)] 36.3 °C (97.4 °F)  Pulse:  [62-71] 62  Resp:  [18-21] 20  SpO2:  [78 %-99 %] 78 %  BP: (128-164)/(67-90) 145/79    Significant Labs:  Lab Results   Component Value Date    WBC 5.62 07/03/2024    HGB  "11.3 (L) 07/03/2024    HCT 36.2 (L) 07/03/2024     07/03/2024    CHOL 114 07/01/2024    TRIG 58 07/01/2024    HDL 25 (L) 07/01/2024    ALT 27 07/03/2024    ALT 30 07/03/2024    AST 21 07/03/2024    AST 22 07/03/2024     07/03/2024     (L) 07/03/2024    K 4.9 07/03/2024    K 5.0 07/03/2024     07/03/2024     07/03/2024    CREATININE 7.9 (H) 07/03/2024    CREATININE 7.8 (H) 07/03/2024    BUN 44 (H) 07/03/2024    BUN 42 (H) 07/03/2024    CO2 19 (L) 07/03/2024    CO2 18 (L) 07/03/2024    TSH 1.423 07/02/2024    PSA 0.74 05/17/2018    INR 1.1 01/05/2024    HGBA1C 5.6 07/01/2024       Diagnostic Studies:    EKG:   Results for orders placed or performed during the hospital encounter of 11/22/21   EKG 12-lead    Collection Time: 11/28/21  8:05 PM    Narrative    Test Reason : R00.0,    Vent. Rate : 123 BPM     Atrial Rate : 123 BPM     P-R Int : 146 ms          QRS Dur : 090 ms      QT Int : 324 ms       P-R-T Axes : 059 057 265 degrees     QTc Int : 463 ms    Sinus tachycardia  Cannot rule out Inferior infarct ,age undetermined  Abnormal ECG  When compared with ECG of 01-NOV-2021 06:50,  T wave inversion now evident in Inferior leads  Confirmed by Yasmani Cummins MD ([id number]) on 11/29/2021 10:26:40 AM    Referred By: [email or web address]   SELF           Confirmed By:Yasmani Cummins MD     TTE 7/2/2024: "1. Moderately depressed left ventricular systolic function. LVEF 30 - 35%. Abnormal   diastolic function with elevated left ventricular filling pressures. Distal   anteroseptal/apical akinesia and inferior hypokinesia.   2. Moderate mitral valve regurgitation. Mitral valve leaflets appear moderately   thickened. Moderate mitral annular calcification. MAC is predominantly posterior. Mitral   valve leaflets appear moderately calcified. Heterogeneous hyperechoic 7x10 mm mobile mass   ventricular aspect of PML c/w vegetative vs degenerative process.   3. The aortic valve cusps appear moderately " "thickened/sclerotic.   4. Moderate tricuspid valve regurgitation. The estimated right ventricular systolic   pressure/PASP is 40-50 mmHg. "       Pre-op Assessment    I have reviewed the Patient Summary Reports.     I have reviewed the Nursing Notes. I have reviewed the NPO Status.   I have reviewed the Medications.     Review of Systems  Anesthesia Hx:   History of prior surgery of interest to airway management or planning:            Denies Personal Hx of Anesthesia complications.                    Cardiovascular:     Hypertension   CAD   CABG/stent (5v 2005)    CHF   PVD     Blood cultures positive for Acinetobacter and mitral valve mass or vegetation    Coronary Artery Disease:                            Hypertension         Pulmonary:        Sleep Apnea (Not using), CPAP On oxygen     Obstructive Sleep Apnea (VENKATESH).      Education provided regarding risk of obstructive sleep apnea            Renal/:  Chronic Renal Disease, ESRD, Dialysis   Last dialysis 7/2.  Dialysis via left arm fistula.  No longer makes urine      Kidney Function/Disease             Hepatic/GI:     GERD      Gerd          Neurological:    Neuromuscular Disease,                                 Neuromuscular Disease   Endocrine:  Diabetes    Diabetes                    Morbid Obesity / BMI > 40      Physical Exam  General: Cooperative, Alert and Oriented  Facial hair   Airway:  Mallampati: III   Mouth Opening: Small, but > 3cm  TM Distance: > 6 cm  Tongue: Normal    Dental:  Periodontal disease  Poor dentition       Anesthesia Plan  Type of Anesthesia, risks & benefits discussed:    Anesthesia Type: Gen Natural Airway  Intra-op Monitoring Plan: Standard ASA Monitors  Post Op Pain Control Plan: multimodal analgesia  Induction:  IV  Informed Consent: Informed consent signed with the Patient and all parties understand the risks and agree with anesthesia plan.  All questions answered.   ASA Score: 4  Day of Surgery Review of History & Physical: " H&P Update referred to the surgeon/provider.  Anesthesia Plan Notes:     Limit fluids.     Ready For Surgery From Anesthesia Perspective.     .

## 2024-07-03 NOTE — ASSESSMENT & PLAN NOTE
- Labs pending from admission  - receives dialysis MWF  - consult to nephrology for inpatient dialysis management

## 2024-07-03 NOTE — CONSULTS
ID acknowledges consult. During rounds patient is out of the room for cards procedure. Continue cefepime and vanco. Full consult to follow

## 2024-07-03 NOTE — PLAN OF CARE
Patient transferred to floor on tele monitor with transport tech.   VSS. No c/o pain.   Pt currently sitting up in bed eating lunch with no distress.

## 2024-07-03 NOTE — ASSESSMENT & PLAN NOTE
"- patient presented to OSH with acute volume overload, EF 35% on echo  - volume overload appears improved with dialysis  - will defer to cardiology for GDMT  - Nuclear medicine Lexiscan stress test from 7/1/2024 (per chart review), low EF "mild transient ischemia for inferoapical scar but no evidence of Lexiscan induced ischemia at this time".   - Cardiology consulted, appreciate assistance.  - repeat chest x-ray     "

## 2024-07-03 NOTE — ASSESSMENT & PLAN NOTE
- previous documentation of his wish to be DNR.   - if decision made to pursue KURTIS, can discuss temporary revoking of code status with patient

## 2024-07-03 NOTE — HPI
67yo male with CAD s/p 5V CABG 2005, PAD s/p bilateral BKA, ESRD on HD, HTN, HLP, DMII, AOCD, acute systolic heart failure and bilateral carotid stenosis who [facility]. He was seen on rounds with Dr. Toth and Dr. Bach. He presented to the ER via EMS on 6/30/2024 with complaints of SOB. He reported non productive cough and diarrhea 4 days prior to admission then began feeling SOB that he reported progressively worsened. He reported it started occurring with lying flat therefore he called EMS and was brought to the ER for evaluation. He does HD M-W-F and denied any missed HD. He was emergently dialyzed and admitted with improvement of his SOB. He underwent echocardiogram than demonstrated LVEF 30-35% and 7x10mm mobile mass to MV concerning for vegetation vs degenerative process. Blood cultures positive for Acinetobacter and CONS and was on IV abx. Transferred to [facility] for KURTIS evaluation

## 2024-07-03 NOTE — PLAN OF CARE
Patient is seen and examined this morning.  Currently has no complaints.  Plan to proceed with KURTIS for further evaluation for bacterial endocarditis.  Cardiology and Infectious Disease on consult

## 2024-07-03 NOTE — ASSESSMENT & PLAN NOTE
- positive blood cultures from OSH  - TTE with 7x10mm mobile mass to mitral valve concerning for vegetation vs degenerative changes  - repeat blood cultures pending today  - will proceed with KURTIS for further evaluation; further recs once KURTIS completed    26-Sep-2020

## 2024-07-03 NOTE — PROGRESS NOTES
Pharmacist Renal Dose Adjustment Note    Alexis Aponte is a 68 y.o. male being treated with the medication zosyn    Patient Data:    Vital Signs (Most Recent):  Temp: 97.4 °F (36.3 °C) (07/03/24 0045)  Pulse: 65 (07/03/24 0045)  Resp: (!) 21 (07/03/24 0045)  BP: 135/80 (07/03/24 0045)  SpO2: 99 % (07/03/24 0045) Vital Signs (72h Range):  Temp:  [97.4 °F (36.3 °C)-97.9 °F (36.6 °C)]   Pulse:  [65-71]   Resp:  [20-21]   BP: (128-135)/(67-80)   SpO2:  [96 %-99 %]      Recent Labs   Lab 07/02/24  0523   CREATININE 6.22*     Serum creatinine: 6.22 mg/dL (H) 07/02/24 0523  Estimated creatinine clearance: 7.9 mL/min (A)    Medication:zosyn dose: 4.5G frequency q8h will be changed to medication:zosyn dose:4.5G frequency:q12h    Pharmacist's Name: Yanci Owens  Pharmacist's Extension: [phone]

## 2024-07-03 NOTE — CONSULTS
Nephrology Consult  H&P      Consult Requested By: Bert Moore MD  Reason for Consult: ESRD      SUBJECTIVE:     History of Present Illness:  Alexis Aponte is a 68 y.o.   male who  has a past medical history of Anticoagulant long-term use, Carotid stenosis, Coronary artery disease, Diabetes mellitus, Dialysis patient, Disorder of kidney and ureter, ESRD (end stage renal disease), GERD (gastroesophageal reflux disease), Heart failure, Hyperlipidemia, Hypertension, PVD (peripheral vascular disease), and Sleep apnea.. The patient presented to the [facility] on 7/3/2024 for Cardiology evaluation with KURTIS. Nephrology consulted for ESRD management    ?    Review of Systems   Constitutional:  Positive for malaise/fatigue. Negative for chills and fever.   HENT:  Negative for congestion and sore throat.    Eyes:  Negative for blurred vision, double vision and photophobia.   Respiratory:  Negative for cough and shortness of breath.    Cardiovascular:  Negative for chest pain, palpitations and leg swelling.   Gastrointestinal:  Negative for abdominal pain, diarrhea, nausea and vomiting.   Genitourinary:  Negative for dysuria and urgency.   Musculoskeletal:  Negative for joint pain and myalgias.   Skin:  Negative for itching and rash.   Neurological:  Negative for dizziness, sensory change, weakness and headaches.   Endo/Heme/Allergies:  Negative for polydipsia. Does not bruise/bleed easily.   Psychiatric/Behavioral:  Negative for depression.        Past Medical History:   Diagnosis Date    Anticoagulant long-term use     Carotid stenosis     Coronary artery disease     Diabetes mellitus     diet controlled    Dialysis patient     Disorder of kidney and ureter     ESRD (end stage renal disease)     GERD (gastroesophageal reflux disease)     Heart failure     Hyperlipidemia     Hypertension     PVD (peripheral vascular disease)     Sleep apnea     np cpap     Past Surgical History:   Procedure Laterality Date     5-bypass heart surgery      AORTOGRAPHY WITH EXTREMITY RUNOFF Right 10/11/2021    Procedure: AORTOGRAM, WITH EXTREMITY RUNOFF;  Surgeon: Richie Cherry MD;  Location: [facility] CATH;  Service: General;  Laterality: Right;    AORTOGRAPHY WITH EXTREMITY RUNOFF N/A 5/20/2022    Procedure: AORTOGRAM, WITH EXTREMITY RUNOFF;  Surgeon: Richie Cherry MD;  Location: [facility] CATH;  Service: General;  Laterality: N/A;    AORTOGRAPHY WITH EXTREMITY RUNOFF Left 10/3/2022    Procedure: AORTOGRAM, WITH EXTREMITY RUNOFF;  Surgeon: Abdirashid Aparicio MD;  Location: [facility] CATH;  Service: Cardiology;  Laterality: Left;    APPENDECTOMY      BELOW KNEE AMPUTATION OF LOWER EXTREMITY Left 12/4/2021    Procedure: AMPUTATION, BELOW KNEE;  Surgeon: Richie Cherry MD;  Location: [facility] OR;  Service: General;  Laterality: Left;    BELOW KNEE AMPUTATION OF LOWER EXTREMITY Right 10/12/2022    Procedure: AMPUTATION, BELOW KNEE;  Surgeon: Richie Cherry MD;  Location: [facility] OR;  Service: General;  Laterality: Right;    HERNIA REPAIR      mass on kidney      MEDIPORT REMOVAL N/A 11/27/2021    Procedure: REMOVAL, CATHETER, CENTRAL VENOUS, TUNNELED, WITH PORT;  Surgeon: Sharad Fleming MD;  Location: [facility] OR;  Service: General;  Laterality: N/A;    TOE AMPUTATION Left 11/27/2021    Procedure: AMPUTATION, TOE;  Surgeon: Kobe Snyder DPM;  Location: [facility] OR;  Service: Podiatry;  Laterality: Left;     Family History   Problem Relation Name Age of Onset    Cancer Sister      Cancer Brother       Social History     Tobacco Use    Smoking status: Former    Smokeless tobacco: Never   Substance Use Topics    Alcohol use: Yes     Comment: 1 per week    Drug use: Yes     Types: Marijuana       Review of patient's allergies indicates:  No Known Allergies         OBJECTIVE:     Vital Signs (Most Recent)  Vitals:    07/03/24 0749 07/03/24 1143 07/03/24 1232 07/03/24 1345   BP:  (!) 145/79 (!) 106/59 (!) 112/58   BP Location:   Right arm Right arm    Patient Position:   Lying Lying   Pulse:  62 62 61   Resp:  20 (!) 21 20   Temp:  97.4 °F (36.3 °C)     TempSrc:       SpO2: 96% (!) 78% 99% 98%   Weight:       Height:             Date 07/03/24 0700 - 07/04/24 0659   Shift 5730-9856 8778-9226 6855-8867 24 Hour Total   INTAKE   P.O. 0   0   IV Piggyback 200   200   Shift Total(mL/kg) 200(2.9)   200(2.9)   OUTPUT   Shift Total(mL/kg)       Weight (kg) 68.6 68.6 68.6 68.6           Medications:   0.9% NaCl   Intravenous Once    amLODIPine  10 mg Oral Daily    aspirin  81 mg Oral Every other day    atorvastatin  80 mg Oral QAM    carvediloL  25 mg Oral BID    ceFEPime IV (PEDS and ADULTS)  2 g Intravenous Q24H    clopidogreL  75 mg Oral Daily    famotidine  40 mg Oral QHS    heparin (porcine)  5,000 Units Subcutaneous Q8H    minoxidiL  5 mg Oral BID    mupirocin   Nasal BID    sevelamer carbonate  800 mg Oral TID WM    vancomycin (VANCOCIN) IV (PEDS and ADULTS)  1,000 mg Intravenous Every Mon, Wed, Fri           Physical Exam  Vitals and nursing note reviewed.   Constitutional:       General: He is not in acute distress.     Appearance: He is not diaphoretic.   HENT:      Head: Normocephalic and atraumatic.      Mouth/Throat:      Pharynx: No oropharyngeal exudate.   Eyes:      General: No scleral icterus.     Conjunctiva/sclera: Conjunctivae normal.      Pupils: Pupils are equal, round, and reactive to light.   Cardiovascular:      Rate and Rhythm: Normal rate and regular rhythm.      Heart sounds: Normal heart sounds. No murmur heard.  Pulmonary:      Effort: Pulmonary effort is normal. No respiratory distress.      Breath sounds: Normal breath sounds.   Abdominal:      General: Bowel sounds are normal. There is no distension.      Palpations: Abdomen is soft.      Tenderness: There is no abdominal tenderness.   Musculoskeletal:         General: Normal range of motion.      Cervical back: Normal range of motion and neck supple.      Comments: AVF    Skin:      "General: Skin is warm and dry.      Findings: No erythema.   Neurological:      Mental Status: He is alert and oriented to person, place, and time.      Cranial Nerves: No cranial nerve deficit.   Psychiatric:         Mood and Affect: Affect normal.         Cognition and Memory: Memory normal.         Judgment: Judgment normal.         Laboratory:  Recent Labs   Lab 07/03/24 0433   WBC 5.62   HGB 11.3*   HCT 36.2*      MONO 10.9  0.6   EOSINOPHIL 3.0     Recent Labs   Lab 07/02/24  0523 07/03/24 0226    133*  137   K 4.3 5.0  4.9    102  102   CO2 21* 18*  19*   BUN 35* 42*  44*   CREATININE 6.22* 7.8*  7.9*   CALCIUM 8.3* 8.9  8.9   PHOS  --  5.3*       Diagnostic Results:  X-Ray: Reviewed  US: Reviewed  Echo: Reviewed  ASSESSMENT/PLAN:     1. ESRD on HD via AVF MWF  - here for endocarditis work up with KURTIS on IV abx Vanx/Cefepime   -- HD today with UF give IV vanc on HD MWF    -- Daily Renal Function Panel  -- Avoid Hypotension.  -- Renally dose all meds  -- Please avoid nephrotoxins, including NSAIDs, aminoglycosides, IV contrast (unless absolutely necessary), gadolinium, fleets and other phosphorous-based laxatives. Caution with antibiotics.    2. HTN (I10) - controlled   3. Anemia of chronic kidney disease treated with JONH   No need for EPogen   Hg at goal >10   Recent Labs   Lab 07/03/24 0433   HGB 11.3*   HCT 36.2*          Iron   Lab Results   Component Value Date    IRON 102 07/01/2024    TIBC 144 (L) 07/01/2024    FERRITIN 2,449.0 (H) 07/01/2024       4. MBD    Recent Labs   Lab 07/03/24 0226   CALCIUM 8.9  8.9   PHOS 5.3*   -- Binders TIDWM    Recent Labs   Lab 07/03/24 0226   MG 2.4  2.4       Lab Results   Component Value Date     (H) 06/05/2024    CALCIUM 8.9 07/03/2024    CALCIUM 8.9 07/03/2024    PHOS 5.3 (H) 07/03/2024     No results found for: "QTYPWFLD35CO"  Acidosis   -- correct with HD   Lab Results   Component Value Date    CO2 19 (L) 07/03/2024 "    CO2 18 (L) 07/03/2024       5. Nutrition/Hypoalbuminemia   Recent Labs   Lab 07/02/24  0523 07/03/24  0226   ALBUMIN 3.0* 2.9*  3.1*     Nepro with meals TID. Renal vitamins daily      Thank you for allowing me to participate in care of your patient  With any question please call   Angela Quinn MD     [facility]  VAL Gonzalez MD,   OMD AB Alanis MD E. V. Harmon, NP  [address]   [place], [id number]  [phone]  After hours answering service: [phone]

## 2024-07-04 PROBLEM — R78.81 BACTEREMIA: Status: ACTIVE | Noted: 2024-07-04

## 2024-07-04 LAB
ALBUMIN SERPL BCP-MCNC: 2.8 G/DL (ref 3.5–5.2)
ALP SERPL-CCNC: 186 U/L (ref 55–135)
ALT SERPL W/O P-5'-P-CCNC: 21 U/L (ref 10–44)
ANION GAP SERPL CALC-SCNC: 12 MMOL/L (ref 8–16)
AST SERPL-CCNC: 17 U/L (ref 10–40)
BASOPHILS # BLD AUTO: 0.05 K/UL (ref 0–0.2)
BASOPHILS NFR BLD: 0.8 % (ref 0–1.9)
BILIRUB SERPL-MCNC: 0.6 MG/DL (ref 0.1–1)
BUN SERPL-MCNC: 24 MG/DL (ref 8–23)
CALCIUM SERPL-MCNC: 8.5 MG/DL (ref 8.7–10.5)
CHLORIDE SERPL-SCNC: 98 MMOL/L (ref 95–110)
CO2 SERPL-SCNC: 26 MMOL/L (ref 23–29)
CREAT SERPL-MCNC: 5.2 MG/DL (ref 0.5–1.4)
DIFFERENTIAL METHOD BLD: ABNORMAL
EOSINOPHIL # BLD AUTO: 0.1 K/UL (ref 0–0.5)
EOSINOPHIL NFR BLD: 1.8 % (ref 0–8)
ERYTHROCYTE [DISTWIDTH] IN BLOOD BY AUTOMATED COUNT: 18.6 % (ref 11.5–14.5)
EST. GFR  (NO RACE VARIABLE): 11 ML/MIN/1.73 M^2
GLUCOSE SERPL-MCNC: 124 MG/DL (ref 70–110)
HCT VFR BLD AUTO: 32.3 % (ref 40–54)
HGB BLD-MCNC: 10.3 G/DL (ref 14–18)
IMM GRANULOCYTES # BLD AUTO: 0.02 K/UL (ref 0–0.04)
IMM GRANULOCYTES NFR BLD AUTO: 0.3 % (ref 0–0.5)
LYMPHOCYTES # BLD AUTO: 1 K/UL (ref 1–4.8)
LYMPHOCYTES NFR BLD: 16.7 % (ref 18–48)
MAGNESIUM SERPL-MCNC: 2 MG/DL (ref 1.6–2.6)
MCH RBC QN AUTO: 30.4 PG (ref 27–31)
MCHC RBC AUTO-ENTMCNC: 31.9 G/DL (ref 32–36)
MCV RBC AUTO: 95 FL (ref 82–98)
MONOCYTES # BLD AUTO: 0.7 K/UL (ref 0.3–1)
MONOCYTES NFR BLD: 11 % (ref 4–15)
NEUTROPHILS # BLD AUTO: 4.3 K/UL (ref 1.8–7.7)
NEUTROPHILS NFR BLD: 69.4 % (ref 38–73)
NRBC BLD-RTO: 0 /100 WBC
PHOSPHATE SERPL-MCNC: 3.5 MG/DL (ref 2.7–4.5)
PLATELET # BLD AUTO: 209 K/UL (ref 150–450)
PMV BLD AUTO: 11.1 FL (ref 9.2–12.9)
POCT GLUCOSE: 108 MG/DL (ref 70–110)
POCT GLUCOSE: 117 MG/DL (ref 70–110)
POCT GLUCOSE: 127 MG/DL (ref 70–110)
POCT GLUCOSE: 134 MG/DL (ref 70–110)
POCT GLUCOSE: 137 MG/DL (ref 70–110)
POTASSIUM SERPL-SCNC: 3.8 MMOL/L (ref 3.5–5.1)
PROT SERPL-MCNC: 6.3 G/DL (ref 6–8.4)
RBC # BLD AUTO: 3.39 M/UL (ref 4.6–6.2)
SODIUM SERPL-SCNC: 136 MMOL/L (ref 136–145)
WBC # BLD AUTO: 6.21 K/UL (ref 3.9–12.7)

## 2024-07-04 PROCEDURE — 83735 ASSAY OF MAGNESIUM: CPT | Performed by: FAMILY MEDICINE

## 2024-07-04 PROCEDURE — 27000221 HC OXYGEN, UP TO 24 HOURS

## 2024-07-04 PROCEDURE — 80053 COMPREHEN METABOLIC PANEL: CPT | Performed by: FAMILY MEDICINE

## 2024-07-04 PROCEDURE — 84100 ASSAY OF PHOSPHORUS: CPT | Performed by: FAMILY MEDICINE

## 2024-07-04 PROCEDURE — 25000003 PHARM REV CODE 250: Performed by: STUDENT IN AN ORGANIZED HEALTH CARE EDUCATION/TRAINING PROGRAM

## 2024-07-04 PROCEDURE — 99222 1ST HOSP IP/OBS MODERATE 55: CPT | Mod: ,,, | Performed by: INTERNAL MEDICINE

## 2024-07-04 PROCEDURE — 99900035 HC TECH TIME PER 15 MIN (STAT)

## 2024-07-04 PROCEDURE — 36415 COLL VENOUS BLD VENIPUNCTURE: CPT | Performed by: FAMILY MEDICINE

## 2024-07-04 PROCEDURE — 63600175 PHARM REV CODE 636 W HCPCS: Performed by: HOSPITALIST

## 2024-07-04 PROCEDURE — 97165 OT EVAL LOW COMPLEX 30 MIN: CPT

## 2024-07-04 PROCEDURE — 63600175 PHARM REV CODE 636 W HCPCS: Performed by: FAMILY MEDICINE

## 2024-07-04 PROCEDURE — 25000003 PHARM REV CODE 250: Performed by: HOSPITALIST

## 2024-07-04 PROCEDURE — 94761 N-INVAS EAR/PLS OXIMETRY MLT: CPT

## 2024-07-04 PROCEDURE — 85025 COMPLETE CBC W/AUTO DIFF WBC: CPT | Performed by: FAMILY MEDICINE

## 2024-07-04 PROCEDURE — 97530 THERAPEUTIC ACTIVITIES: CPT

## 2024-07-04 PROCEDURE — 25000003 PHARM REV CODE 250: Performed by: FAMILY MEDICINE

## 2024-07-04 PROCEDURE — 11000001 HC ACUTE MED/SURG PRIVATE ROOM

## 2024-07-04 RX ADMIN — HEPARIN SODIUM 5000 UNITS: 5000 INJECTION INTRAVENOUS; SUBCUTANEOUS at 06:07

## 2024-07-04 RX ADMIN — CARVEDILOL 25 MG: 25 TABLET, FILM COATED ORAL at 08:07

## 2024-07-04 RX ADMIN — AMLODIPINE BESYLATE 10 MG: 5 TABLET ORAL at 08:07

## 2024-07-04 RX ADMIN — HEPARIN SODIUM 5000 UNITS: 5000 INJECTION INTRAVENOUS; SUBCUTANEOUS at 02:07

## 2024-07-04 RX ADMIN — ATORVASTATIN CALCIUM 80 MG: 40 TABLET, FILM COATED ORAL at 06:07

## 2024-07-04 RX ADMIN — SEVELAMER CARBONATE 800 MG: 800 TABLET, FILM COATED ORAL at 08:07

## 2024-07-04 RX ADMIN — SEVELAMER CARBONATE 800 MG: 800 TABLET, FILM COATED ORAL at 05:07

## 2024-07-04 RX ADMIN — MINOXIDIL 5 MG: 2.5 TABLET ORAL at 08:07

## 2024-07-04 RX ADMIN — MINOXIDIL 5 MG: 2.5 TABLET ORAL at 09:07

## 2024-07-04 RX ADMIN — CLOPIDOGREL BISULFATE 75 MG: 75 TABLET ORAL at 08:07

## 2024-07-04 RX ADMIN — MEROPENEM 500 MG: 500 INJECTION, POWDER, FOR SOLUTION INTRAVENOUS at 12:07

## 2024-07-04 RX ADMIN — FAMOTIDINE 40 MG: 20 TABLET ORAL at 09:07

## 2024-07-04 RX ADMIN — CARVEDILOL 25 MG: 25 TABLET, FILM COATED ORAL at 09:07

## 2024-07-04 RX ADMIN — SEVELAMER CARBONATE 800 MG: 800 TABLET, FILM COATED ORAL at 12:07

## 2024-07-04 NOTE — ASSESSMENT & PLAN NOTE
Patient with Hypoxic Respiratory failure which is Acute.  he is not on home oxygen. Supplemental oxygen was provided and noted-      .   Signs/symptoms of respiratory failure include- respiratory distress. Contributing diagnoses includes -  volume overload  Labs and images were reviewed. Patient Has not had a recent ABG. Will treat underlying causes and adjust management of respiratory failure as follows- continue dialysis

## 2024-07-04 NOTE — PROGRESS NOTES
[facility] Medicine  Progress Note    Patient Name: Alexis Aponte  MRN: [id number]  Patient Class: IP- Inpatient   Admission Date: 7/3/2024  Length of Stay: 1 days  Attending Physician: Bert Moore MD  Primary Care Provider: Richie Cherry MD        Subjective:     Principal Problem:Bacterial endocarditis        HPI:  68 y.o. male with a past medical history of CAD (5 vessel CABG 2005), PVD (bilateral BKA) ESRD (HD MWF, via fistula), history of C. dif who presents as a transfer due to concern for endocarditis. He was admitted to [facility] on 6/30 for shortness of breath. As part of his work-up, found to have new systolic heart failure (EF 30-35%) with possible vegetation on the mitral valve. Admission blood cultures at OSH were positive for Acinetobacter and CONS (per transfer note). Of note, troponin and BNP were elevated on admission. Cardiology consulted at OSH and felt it was due to demand ischemia. A KURTIS was recommended, so he was transferred to [facility]. D-dimer elevated at OSH, CTPE performed without evidence of embolism.     Overall, he feels his symptoms have improved since admission. He initially felt SOB on the day of admission, that has since improved after receiving dialysis at OSH. Remains on 2L of [place] No chest pain, no nausea, no subjective fevers/kills, no diarrhea.        Overview/Hospital Course:  No notes on file    Interval History:     No acute events overnight   Patient no complaints  Denies fever chills nausea or vomiting  Is eager to go home    Review of Systems   Constitutional:  Negative for chills and fever.   Respiratory:  Negative for cough and shortness of breath.    Cardiovascular:  Negative for chest pain and leg swelling.   Gastrointestinal:  Negative for abdominal distention, abdominal pain, diarrhea, nausea and vomiting.   Neurological:  Negative for dizziness.   Psychiatric/Behavioral:  Negative for agitation and confusion.       Objective:     Vital Signs (Most Recent):  Temp: 98.5 °F (36.9 °C) (07/04/24 1136)  Pulse: 71 (07/04/24 1254)  Resp: 18 (07/04/24 1136)  BP: (!) 99/53 (07/04/24 1136)  SpO2: 99 % (07/04/24 1136) Vital Signs (24h Range):  Temp:  [97.6 °F (36.4 °C)-98.5 °F (36.9 °C)] 98.5 °F (36.9 °C)  Pulse:  [62-77] 71  Resp:  [16-20] 18  SpO2:  [95 %-99 %] 99 %  BP: ()/(53-70) 99/53     Weight: 68.6 kg (151 lb 3.8 oz)  Body mass index is 46.15 kg/m².    Intake/Output Summary (Last 24 hours) at 7/4/2024 1414  Last data filed at 7/3/2024 2045  Gross per 24 hour   Intake 500 ml   Output 2500 ml   Net -2000 ml         Physical Exam  Constitutional:       General: He is not in acute distress.     Appearance: He is ill-appearing. He is not toxic-appearing.   HENT:      Mouth/Throat:      Mouth: Mucous membranes are moist.      Pharynx: Oropharynx is clear.   Eyes:      Conjunctiva/sclera: Conjunctivae normal.   Cardiovascular:      Rate and Rhythm: Normal rate and regular rhythm.   Pulmonary:      Effort: Pulmonary effort is normal. No respiratory distress.      Breath sounds: Normal breath sounds. No wheezing.   Abdominal:      General: Bowel sounds are normal. There is no distension.      Palpations: Abdomen is soft.      Tenderness: There is no abdominal tenderness.   Musculoskeletal:         General: Normal range of motion.      Comments: Status post BKA bilateral   Skin:     General: Skin is warm and dry.   Neurological:      General: No focal deficit present.      Mental Status: He is alert and oriented to person, place, and time.   Psychiatric:         Mood and Affect: Mood normal.         Behavior: Behavior normal.             Significant Labs: All pertinent labs within the past 24 hours have been reviewed.    Significant Imaging: I have reviewed all pertinent imaging results/findings within the past 24 hours.    Assessment/Plan:      * Bacterial endocarditis  Bacteremia, unclear source    6/30 blood cultures with with  "Staphylococcus species and  Acinetobacter calcoaceticus-baumannii complex  Transferred to [facility] for concern of mitral valve vegetation.  However KURTIS findings did not show vegetation    Plan:  -vancomycin/cefepime change to meropenem and vancomycin based on culture results  -Repeat blood cultures  -Infectious disease consulted; appreciate recommendations.  - hx of c. Dif infection per patient, will defer to ID if prophylactic oral vanc would benefit patient  - cardiology consulted, appreciate assistance      Bacteremia        Acute hypoxic respiratory failure  Patient with Hypoxic Respiratory failure which is Acute.  he is not on home oxygen. Supplemental oxygen was provided and noted-      .   Signs/symptoms of respiratory failure include- respiratory distress. Contributing diagnoses includes -  volume overload  Labs and images were reviewed. Patient Has not had a recent ABG. Will treat underlying causes and adjust management of respiratory failure as follows- continue dialysis    BKA stump complication  - skin breakdown noted on bilateral stumps, possible diabetic ulceration.   - no obvious signs of infection    Plan:  - wound care consult placed      Acute systolic heart failure  - patient presented to OSH with acute volume overload, EF 35% on echo  Nuclear medicine Lexiscan stress test from 7/1/2024 (per chart review), low EF "mild transient ischemia for inferoapical scar but no evidence of Lexiscan induced ischemia at this time".     - volume overload appears improved with dialysis    Plan:  - Cardiology consulted, appreciate assistance.        Anemia in chronic kidney disease, on chronic dialysis  - hgb appears stable    ACP (advance care planning)  - previous documentation of his wish to be DNR.     Ongoing goals care discussion        Hyperlipidemia  - Continue statin      Coronary artery disease  - Known CAD s/p CABG. Had elevated troponins at OSH, evaluated by cardiology. [place] to be Type II NSTEMI. SPECT " at OSH reportedly negative    Plan:  - aspirin, statin      Type 2 diabetes mellitus, without long-term current use of insulin  - A1c 5.6 on 7/1  - glucose checks          Essential hypertension  Currently normotensive    Plan:  - continue current regimen  - will defer to renal for overall BP changes  - home regimen included clonidine. Per chart review, he was well controlled on current regimen. Will discontinue for now. Can consider if evidence of rebound hypertension    ESRD (end stage renal disease)  Receives dialysis MWF    Plan  - consult to nephrology for inpatient dialysis management      VTE Risk Mitigation (From admission, onward)           Ordered     heparin (porcine) injection 5,000 Units  Every 8 hours         07/03/24 0109     IP VTE HIGH RISK PATIENT  Once         07/03/24 0109     Place sequential compression device  Until discontinued         07/03/24 0109                    Discharge Planning   JENNIFER: 7/8/2024     Code Status: Full Code   Is the patient medically ready for discharge?:     Reason for patient still in hospital (select all that apply): Patient trending condition, Laboratory test, and Treatment  Discharge Plan A: Home with family                  Bert Moore MD  Department of Hospital Medicine   [facility]

## 2024-07-04 NOTE — PLAN OF CARE
Problem: Occupational Therapy  Goal: Occupational Therapy Goal  Description: Goals to be met by: D/C     Patient will increase functional independence with ADLs by performing:    LE Dressing with Minimal Assistance.  Grooming while seated with Set-up Assistance.  Toileting from bedside commode with Stand-by Assistance for hygiene and clothing management.   Toilet transfer to bedside commode with prosthesis with Stand-by Assistance.  Upper extremity exercise program 2x10 reps per handout, with independence.    Outcome: Progressing

## 2024-07-04 NOTE — PROGRESS NOTES
07/03/24 2045   Post-Hemodialysis Assessment   Rinseback Volume (mL) 250 mL   Blood Volume Processed (Liters) 86.5 L   Dialyzer Clearance Moderately streaked   Duration of Treatment 240 minutes   Additional Fluid Intake (mL) 500 mL   Total UF (mL) 2500 mL   Net Fluid Removal 2000   Patient Response to Treatment tx completed as ordered tolerated well   Post-Hemodialysis Comments Blood returned per pp, pt AVF bled x 5 minutes, zero Bleeding noted now dsg place cdi

## 2024-07-04 NOTE — NURSING
IV Insertion     Failed IV insertion.  Attempted twice.  Consult to anesthesia recommended. Erica, bedside nurse notified.

## 2024-07-04 NOTE — PT/OT/SLP EVAL
"Occupational Therapy   Evaluation and Treatment    Name: Alexis Aponte  MRN: [id number]  Admitting Diagnosis: Bacterial endocarditis  Recent Surgery: Procedure(s) (LRB):  Transesophageal echo (KURTIS) intra-procedure log documentation (N/A) 1 Day Post-Op    Recommendations:     Discharge Recommendations: Moderate Intensity Therapy  Discharge Equipment Recommendations:  none  Barriers to discharge:  None    Assessment:     Alexis Aponte is a 68 y.o. male with a medical diagnosis of Bacterial endocarditis.  He presents with decreased independence with ADLs and functional mobility. Patient states that his daughter is supposed to bring his bilateral prosthesis but hasn't yet. Performance deficits affecting function: weakness, impaired endurance, impaired self care skills, impaired functional mobility, impaired balance, decreased lower extremity function, impaired cardiopulmonary response to activity.      Rehab Prognosis: Good; patient would benefit from acute skilled OT services to address these deficits and reach maximum level of function.       Plan:     Patient to be seen 3 x/week to address the above listed problems via self-care/home management, therapeutic activities, therapeutic exercises  Plan of Care Expires: 07/19/24  Plan of Care Reviewed with: patient    Subjective     Chief Complaint: "I wanna get out of here"  Patient/Family Comments/goals: to return home    Occupational Profile:  Living Environment: Pt lives alone in [facility] with tub/shower combo and walk-in shower with grab bars.  Previous level of function: Mod I with ADLs, uses RW to assist with transfers to wheelchair, walks short distances and around his house with RW  Roles and Routines: retired   Equipment Used at Home: wheelchair, walker, rolling, shower chair, prosthesis, bedside commode, grab bar  Assistance upon Discharge: family as needed     Pain/Comfort:  Pain Rating 1: 0/10    Patients cultural, spiritual, Restorationist conflicts given the current " "situation: no    Objective:     Communicated with: FRANCISCO J Spence prior to session.  Patient found  sitting up in bed  with bed alarm, peripheral IV, telemetry, oxygen upon OT entry to room.    General Precautions: Standard, fall, vision impaired  Orthopedic Precautions: N/A  Braces: N/A  Respiratory Status: Nasal cannula, flow 3 L/min    Occupational Performance:    Bed Mobility:    Patient completed Rolling/Turning to Left with  modified independence and with side rail  Patient completed Rolling/Turning to Right with modified independence and with side rail    Functional Mobility/Transfers:  Functional Mobility: Pt performed rolling and able to sit up in bed with modified independence. Pt declined sitting EOB this morning but states, "I can do it. I get around."    Activities of Daily Living:  Feeding:  set up    Grooming: stand by assistance    Bathing: moderate assistance    Upper Body Dressing: minimum assistance    Lower Body Dressing: moderate assistance    Toileting: maximal assistance      Cognitive/Visual Perceptual:  Cognitive/Psychosocial Skills:     Oriented to: Person, Place, Time, and Situation   Follows Commands/attention:Follows multistep  commands  Communication: clear/fluent  Memory: No Deficits noted  Mood/Affect/Coping skills/emotional control: Appropriate to situation    Physical Exam:  Upper Extremity Range of Motion:     Right Upper Extremity: WFL  Left Upper Extremity: WFL  Upper Extremity Strength:   Right Upper Extremity: WFL  Left Upper Extremity: WFL   Strength:   Right Upper Extremity: WFL  Left Upper Extremity: WFL    Treatment and Education:  Patient educated on role of acute care OT and OT POC, safety while in hospital including calling nurse for mobility, and call light usage  Patient educated about importance of OOB mobility and remaining up in chair most of the day.    AMPAC 6 Click ADL:  AMPAC Total Score: 19    Patient left supine with all lines intact, call button in reach, bed " alarm on, and nursing notified    GOALS:   Multidisciplinary Problems       Occupational Therapy Goals          Problem: Occupational Therapy    Goal Priority Disciplines Outcome Interventions   Occupational Therapy Goal     OT, PT/OT Progressing    Description: Goals to be met by: D/C     Patient will increase functional independence with ADLs by performing:    LE Dressing with Minimal Assistance.  Grooming while seated with Set-up Assistance.  Toileting from bedside commode with Stand-by Assistance for hygiene and clothing management.   Toilet transfer to bedside commode with prosthesis with Stand-by Assistance.  Upper extremity exercise program 2x10 reps per handout, with independence.                         History:     Past Medical History:   Diagnosis Date    Anticoagulant long-term use     Carotid stenosis     Coronary artery disease     Diabetes mellitus     diet controlled    Dialysis patient     Disorder of kidney and ureter     ESRD (end stage renal disease)     GERD (gastroesophageal reflux disease)     Heart failure     Hyperlipidemia     Hypertension     PVD (peripheral vascular disease)     Sleep apnea     np cpap         Past Surgical History:   Procedure Laterality Date    5-bypass heart surgery      AORTOGRAPHY WITH EXTREMITY RUNOFF Right 10/11/2021    Procedure: AORTOGRAM, WITH EXTREMITY RUNOFF;  Surgeon: Richie Cherry MD;  Location: [facility] CATH;  Service: General;  Laterality: Right;    AORTOGRAPHY WITH EXTREMITY RUNOFF N/A 5/20/2022    Procedure: AORTOGRAM, WITH EXTREMITY RUNOFF;  Surgeon: Richie Cherry MD;  Location: [facility] CATH;  Service: General;  Laterality: N/A;    AORTOGRAPHY WITH EXTREMITY RUNOFF Left 10/3/2022    Procedure: AORTOGRAM, WITH EXTREMITY RUNOFF;  Surgeon: Abdirashid Aparicio MD;  Location: [facility] CATH;  Service: Cardiology;  Laterality: Left;    APPENDECTOMY      BELOW KNEE AMPUTATION OF LOWER EXTREMITY Left 12/4/2021    Procedure: AMPUTATION, BELOW KNEE;  Surgeon:  Richie Cherry MD;  Location: [facility] OR;  Service: General;  Laterality: Left;    BELOW KNEE AMPUTATION OF LOWER EXTREMITY Right 10/12/2022    Procedure: AMPUTATION, BELOW KNEE;  Surgeon: Richie Cherry MD;  Location: [facility] OR;  Service: General;  Laterality: Right;    HERNIA REPAIR      mass on kidney      MEDIPORT REMOVAL N/A 11/27/2021    Procedure: REMOVAL, CATHETER, CENTRAL VENOUS, TUNNELED, WITH PORT;  Surgeon: Sharad Fleming MD;  Location: [facility] OR;  Service: General;  Laterality: N/A;    TOE AMPUTATION Left 11/27/2021    Procedure: AMPUTATION, TOE;  Surgeon: Kobe Snyder DPM;  Location: [facility] OR;  Service: Podiatry;  Laterality: Left;       Time Tracking:     OT Date of Treatment: 07/04/24  OT Start Time: 0830  OT Stop Time: 0852  OT Total Time (min): 22 min    Billable Minutes:Evaluation 12  Therapeutic Activity 10    7/4/2024

## 2024-07-04 NOTE — PLAN OF CARE
Problem: Adult Inpatient Plan of Care  Goal: Plan of Care Review  Outcome: Progressing     Problem: Diabetes Comorbidity  Goal: Blood Glucose Level Within Targeted Range  Outcome: Progressing     Problem: Infection  Goal: Absence of Infection Signs and Symptoms  Outcome: Progressing

## 2024-07-04 NOTE — SUBJECTIVE & OBJECTIVE
Interval History:     No acute events overnight   Patient no complaints  Denies fever chills nausea or vomiting  Is eager to go home    Review of Systems   Constitutional:  Negative for chills and fever.   Respiratory:  Negative for cough and shortness of breath.    Cardiovascular:  Negative for chest pain and leg swelling.   Gastrointestinal:  Negative for abdominal distention, abdominal pain, diarrhea, nausea and vomiting.   Neurological:  Negative for dizziness.   Psychiatric/Behavioral:  Negative for agitation and confusion.      Objective:     Vital Signs (Most Recent):  Temp: 98.5 °F (36.9 °C) (07/04/24 1136)  Pulse: 71 (07/04/24 1254)  Resp: 18 (07/04/24 1136)  BP: (!) 99/53 (07/04/24 1136)  SpO2: 99 % (07/04/24 1136) Vital Signs (24h Range):  Temp:  [97.6 °F (36.4 °C)-98.5 °F (36.9 °C)] 98.5 °F (36.9 °C)  Pulse:  [62-77] 71  Resp:  [16-20] 18  SpO2:  [95 %-99 %] 99 %  BP: ()/(53-70) 99/53     Weight: 68.6 kg (151 lb 3.8 oz)  Body mass index is 46.15 kg/m².    Intake/Output Summary (Last 24 hours) at 7/4/2024 1414  Last data filed at 7/3/2024 2045  Gross per 24 hour   Intake 500 ml   Output 2500 ml   Net -2000 ml         Physical Exam  Constitutional:       General: He is not in acute distress.     Appearance: He is ill-appearing. He is not toxic-appearing.   HENT:      Mouth/Throat:      Mouth: Mucous membranes are moist.      Pharynx: Oropharynx is clear.   Eyes:      Conjunctiva/sclera: Conjunctivae normal.   Cardiovascular:      Rate and Rhythm: Normal rate and regular rhythm.   Pulmonary:      Effort: Pulmonary effort is normal. No respiratory distress.      Breath sounds: Normal breath sounds. No wheezing.   Abdominal:      General: Bowel sounds are normal. There is no distension.      Palpations: Abdomen is soft.      Tenderness: There is no abdominal tenderness.   Musculoskeletal:         General: Normal range of motion.      Comments: Status post BKA bilateral   Skin:     General: Skin is warm  and dry.   Neurological:      General: No focal deficit present.      Mental Status: He is alert and oriented to person, place, and time.   Psychiatric:         Mood and Affect: Mood normal.         Behavior: Behavior normal.             Significant Labs: All pertinent labs within the past 24 hours have been reviewed.    Significant Imaging: I have reviewed all pertinent imaging results/findings within the past 24 hours.

## 2024-07-04 NOTE — ASSESSMENT & PLAN NOTE
- skin breakdown noted on bilateral stumps, possible diabetic ulceration.   - no obvious signs of infection    Plan:  - wound care consult placed

## 2024-07-04 NOTE — CONSULTS
[facility] - Telemetry  Infectious Disease  Consult Note    Patient Name: Alexis Aponte  MRN: [id number]  Admission Date: 7/3/2024  Hospital Length of Stay: 1 days  Attending Physician: Bert Moore MD  Primary Care Provider: Richie Cherry MD     Isolation Status: No active isolations    Patient information was obtained from patient.      Consults  Assessment/Plan:     ID  Bacteremia  68 y.o. male with a past medical history of CAD (5 vessel CABG 2005), PVD (bilateral BKA) ESRD (HD MWF, via fistula), history of C. dif who presents as a transfer due to concern for endocarditis.    -KURTIS done here with possible endocarditis but other diagnosis considered more likely by read  -continue vanco and cefepime dosed for HD  -goal VT is 15-20  -need to obtain cultures from outside facility   -await our blood cxs  -clinical course will determine duration of antibiotics     -by report outside cx shows acinetobacter intermediate to cefepime - would stop cefepime and start meropenem dosed 0.5g daily for now (will discuss intermittent HD dosing with pharmacy)        Thank you for your consult. I will follow-up with patient. Please contact us if you have any additional questions.    Damian Oropeza MD  Infectious Disease  [facility] - Telemetry    Subjective:     Principal Problem: Bacterial endocarditis    HPI: 68 y.o. male with a past medical history of CAD (5 vessel CABG 2005), PVD (bilateral BKA) ESRD (HD MWF, via fistula), history of C. dif who presents as a transfer due to concern for endocarditis. He was admitted to [facility] on 6/30 for shortness of breath. As part of his work-up, found to have new systolic heart failure (EF 30-35%) with possible vegetation on the mitral valve. Admission blood cultures at OSH were positive for Acinetobacter and stpah (per transfer note). Of note, troponin and BNP were elevated on admission. Cardiology consulted at OSH and felt it was due to demand ischemia. A KURTIS was  recommended, so he was transferred to [facility]. Overall, he feels his symptoms have improved since admission. He initially felt SOB on the day of admission, that has since improved after receiving dialysis at OSH. Remains on 2L of [place] No chest pain, no nausea, no subjective fevers/kills, and no diarrhea.     KURTIS done here was read as having calcified nodule on his mitral valve.The read states this is less likely to be infection vs. degeneration of the valve. Blood cxs here are ngtd. He is on vanco and cefepime    Past Medical History:   Diagnosis Date    Anticoagulant long-term use     Carotid stenosis     Coronary artery disease     Diabetes mellitus     diet controlled    Dialysis patient     Disorder of kidney and ureter     ESRD (end stage renal disease)     GERD (gastroesophageal reflux disease)     Heart failure     Hyperlipidemia     Hypertension     PVD (peripheral vascular disease)     Sleep apnea     np cpap       Past Surgical History:   Procedure Laterality Date    5-bypass heart surgery      AORTOGRAPHY WITH EXTREMITY RUNOFF Right 10/11/2021    Procedure: AORTOGRAM, WITH EXTREMITY RUNOFF;  Surgeon: Richie Cherry MD;  Location: [facility] CATH;  Service: General;  Laterality: Right;    AORTOGRAPHY WITH EXTREMITY RUNOFF N/A 5/20/2022    Procedure: AORTOGRAM, WITH EXTREMITY RUNOFF;  Surgeon: Richie Cherry MD;  [facility];  Service: General;  Laterality: N/A;    AORTOGRAPHY WITH EXTREMITY RUNOFF Left 10/3/2022    Procedure: AORTOGRAM, WITH EXTREMITY RUNOFF;  Surgeon: Abdirashid Aparicio MD;  Location: [facility] CATH;  Service: Cardiology;  Laterality: Left;    APPENDECTOMY      BELOW KNEE AMPUTATION OF LOWER EXTREMITY Left 12/4/2021    Procedure: AMPUTATION, BELOW KNEE;  Surgeon: Richie Cherry MD;  Location: [facility] OR;  Service: General;  Laterality: Left;    BELOW KNEE AMPUTATION OF LOWER EXTREMITY Right 10/12/2022    Procedure: AMPUTATION, BELOW KNEE;  Surgeon: Richie Cherry,  MD;  Location: [facility] OR;  Service: General;  Laterality: Right;    HERNIA REPAIR      mass on kidney      MEDIPORT REMOVAL N/A 11/27/2021    Procedure: REMOVAL, CATHETER, CENTRAL VENOUS, TUNNELED, WITH PORT;  Surgeon: Sharad Fleming MD;  Location: [facility] OR;  Service: General;  Laterality: N/A;    TOE AMPUTATION Left 11/27/2021    Procedure: AMPUTATION, TOE;  Surgeon: Kobe Snyder DPM;  Location: [facility] OR;  Service: Podiatry;  Laterality: Left;       Review of patient's allergies indicates:  No Known Allergies    Medications:  Medications Prior to Admission   Medication Sig    acetaminophen (TYLENOL) 325 MG tablet Take 1 tablet (325 mg total) by mouth every 6 (six) hours as needed for Pain.    aspirin (ECOTRIN) 81 MG EC tablet Take 1 tablet (81 mg total) by mouth every other day.    atorvastatin (LIPITOR) 80 MG tablet Take 1 tablet (80 mg total) by mouth every morning.    calcitRIOL (ROCALTROL) 0.5 MCG Cap Take 1 capsule (0.5 mcg total) by mouth once daily.    carvediloL (COREG) 25 MG tablet Take 1 tablet (25 mg total) by mouth 2 (two) times daily.    clopidogreL (PLAVIX) 75 mg tablet Take 1 tablet (75 mg total) by mouth once daily.    famotidine (PEPCID) 40 MG tablet Take 1 tablet (40 mg total) by mouth every evening. (Patient taking differently: Take 40 mg by mouth every evening.)    gabapentin (NEURONTIN) 300 MG capsule Take 1 capsule (300 mg total) by mouth every evening. (Patient taking differently: Take 300 mg by mouth every evening.)    minoxidiL (LONITEN) 2.5 MG tablet Take 2 tablets (5 mg total) by mouth 2 (two) times daily.    sevelamer carbonate (RENVELA) 800 mg Tab Take 1 tablet (800 mg total) by mouth 3 (three) times daily with meals.    polyethylene glycol (GLYCOLAX) 17 gram PwPk Take 17 g by mouth once daily.     Antibiotics (From admission, onward)      Start     Stop Route Frequency Ordered    07/03/24 1700  vancomycin (VANCOCIN) 1,000 mg in D5W 250 mL IVPB (Vial-Mate)  (vancomycin IVPB (PEDS and  "ADULTS))         -- IV Every Mon, Wed, Fri 07/03/24 1018    07/03/24 1500  ceFEPIme (MAXIPIME) 2 g in D5W 100 mL IVPB (MB+)         -- IV Every 24 hours (non-standard times) 07/03/24 0220    07/03/24 0900  mupirocin 2 % ointment         07/08/24 0859 Nasl 2 times daily 07/03/24 0113    07/03/24 0208  vancomycin - pharmacy to dose  (vancomycin IVPB (PEDS and ADULTS))        Placed in "And" Linked Group    -- IV pharmacy to manage frequency 07/03/24 0109          Antifungals (From admission, onward)      None          Antivirals (From admission, onward)      None             Immunization History   Administered Date(s) Administered    PPD Test 06/11/2021, 12/20/2021, 11/04/2022       Family History       Problem Relation (Age of Onset)    Cancer Sister, Brother          Social History     Socioeconomic History    Marital status:    Tobacco Use    Smoking status: Former    Smokeless tobacco: Never   Substance and Sexual Activity    Alcohol use: Yes     Comment: 1 per week    Drug use: Yes     Types: Marijuana     Social Determinants of Health     Financial Resource Strain: Low Risk  (6/30/2024)    [facility] from [facility] and Its Subsidiaries and Affiliates    Overall Financial Resource Strain (CARDIA)     Difficulty of Paying Living Expenses: Not hard at all   Food Insecurity: No Food Insecurity (6/30/2024)    Received from [facility] and Its Subsidiaries and Affiliates    Hunger Vital Sign     Worried About Running Out of Food in the Last Year: Never true     Ran Out of Food in the Last Year: Never true   Transportation Needs: No Transportation Needs (6/30/2024)    [facility]    PRAPARE - Transportation     Lack of Transportation (Medical): No     Lack of Transportation (Non-Medical): No   Stress: No Stress Concern Present (6/30/2024)    [facility] " [facility] and Its Subsidiaries and Affiliates    [facility] Occup[facility] - Occupational Stress Questionnaire     Feeling of Stress : Only a little     Review of Systems   Constitutional:  Positive for activity change and fatigue. Negative for fever.   HENT: Negative.     Eyes: Negative.    Respiratory:  Positive for shortness of breath.    Cardiovascular: Negative.    Gastrointestinal: Negative.    Endocrine: Negative.    Genitourinary: Negative.    Musculoskeletal: Negative.    Allergic/Immunologic: Negative.    Neurological: Negative.    Hematological: Negative.    Psychiatric/Behavioral: Negative.     All other systems reviewed and are negative.    Objective:     Vital Signs (Most Recent):  Temp: 97.4 °F (36.3 °C) (07/03/24 1143)  Pulse: 62 (07/03/24 1143)  Resp: 20 (07/03/24 1143)  BP: (!) 145/79 (07/03/24 1143)  SpO2: (!) 78 % (07/03/24 1143) Vital Signs (24h Range):  Temp:  [96 °F (35.6 °C)-97.9 °F (36.6 °C)] 97.4 °F (36.3 °C)  Pulse:  [62-71] 62  Resp:  [18-21] 20  SpO2:  [78 %-99 %] 78 %  BP: (128-164)/(67-90) 145/79     Weight: 68.6 kg (151 lb 3.8 oz)  Body mass index is 46.15 kg/m².    Estimated Creatinine Clearance: 6.2 mL/min (A) (based on SCr of 7.9 mg/dL (H)).     Physical Exam  Constitutional:       General: He is not in acute distress.     Appearance: He is ill-appearing.   HENT:      Head: Normocephalic and atraumatic.      Mouth/Throat:      Mouth: Mucous membranes are moist.      Pharynx: Oropharynx is clear.   Eyes:      General: No scleral icterus.     Extraocular Movements: Extraocular movements intact.   Cardiovascular:      Rate and Rhythm: Normal rate and regular rhythm.      Heart sounds: No murmur heard.     No friction rub. No gallop.   Pulmonary:      Effort: Pulmonary effort is normal.      Breath sounds: Normal breath sounds.   Musculoskeletal:         General: No swelling or tenderness.      Cervical back: Normal range of  motion and neck supple.   Skin:     Comments: Small ulcerations noted on bilateral BKA stump.    Neurological:      General: No focal deficit present.      Mental Status: He is oriented to person, place, and time.   Psychiatric:         Mood and Affect: Mood normal.         Behavior: Behavior normal.          Significant Labs: All pertinent labs within the past 24 hours have been reviewed.    Significant Imaging: I have reviewed all pertinent imaging results/findings within the past 24 hours.

## 2024-07-04 NOTE — ASSESSMENT & PLAN NOTE
Currently normotensive    Plan:  - continue current regimen  - will defer to renal for overall BP changes  - home regimen included clonidine. Per chart review, he was well controlled on current regimen. Will discontinue for now. Can consider if evidence of rebound hypertension

## 2024-07-04 NOTE — PROGRESS NOTES
Nephrology Progress Note       Consult Requested By: Bert Moore MD  Reason for Consult: ESRD      SUBJECTIVE:       ?    Review of Systems   Constitutional:  Positive for malaise/fatigue. Negative for chills and fever.   HENT:  Negative for congestion and sore throat.    Eyes:  Negative for blurred vision, double vision and photophobia.   Respiratory:  Negative for cough and shortness of breath.    Cardiovascular:  Negative for chest pain, palpitations and leg swelling.   Gastrointestinal:  Negative for abdominal pain, diarrhea, nausea and vomiting.   Genitourinary:  Negative for dysuria and urgency.   Musculoskeletal:  Negative for joint pain and myalgias.   Skin:  Negative for itching and rash.   Neurological:  Negative for dizziness, sensory change, weakness and headaches.   Endo/Heme/Allergies:  Negative for polydipsia. Does not bruise/bleed easily.   Psychiatric/Behavioral:  Negative for depression.        Past Medical History:   Diagnosis Date    Anticoagulant long-term use     Carotid stenosis     Coronary artery disease     Diabetes mellitus     diet controlled    Dialysis patient     Disorder of kidney and ureter     ESRD (end stage renal disease)     GERD (gastroesophageal reflux disease)     Heart failure     Hyperlipidemia     Hypertension     PVD (peripheral vascular disease)     Sleep apnea     np cpap          OBJECTIVE:     Vital Signs (Most Recent)  Vitals:    07/04/24 0418 07/04/24 0745 07/04/24 0805 07/04/24 1136   BP:   129/64 (!) 99/53   BP Location:   Right arm Right arm   Patient Position:   Lying Sitting   Pulse: 75  71 69   Resp:   18 18   Temp:   97.7 °F (36.5 °C) 98.5 °F (36.9 °C)   TempSrc:   Oral Oral   SpO2:  95% 97% 99%   Weight:       Height:                       Medications:   0.9% NaCl   Intravenous Once    amLODIPine  10 mg Oral Daily    aspirin  81 mg Oral Every other day    atorvastatin  80 mg Oral QAM    carvediloL  25 mg Oral BID    ceFEPime IV (PEDS and ADULTS)  2 g  Intravenous Q24H    clopidogreL  75 mg Oral Daily    famotidine  40 mg Oral QHS    heparin (porcine)  5,000 Units Subcutaneous Q8H    minoxidiL  5 mg Oral BID    mupirocin   Nasal BID    sevelamer carbonate  800 mg Oral TID WM    vancomycin (VANCOCIN) IV (PEDS and ADULTS)  1,000 mg Intravenous Every Mon, Wed, Fri           Physical Exam  Vitals and nursing note reviewed.   Constitutional:       General: He is not in acute distress.     Appearance: He is not diaphoretic.   HENT:      Head: Normocephalic and atraumatic.      Mouth/Throat:      Pharynx: No oropharyngeal exudate.   Eyes:      General: No scleral icterus.     Conjunctiva/sclera: Conjunctivae normal.      Pupils: Pupils are equal, round, and reactive to light.   Cardiovascular:      Rate and Rhythm: Normal rate and regular rhythm.      Heart sounds: Normal heart sounds. No murmur heard.  Pulmonary:      Effort: Pulmonary effort is normal. No respiratory distress.      Breath sounds: Normal breath sounds.   Abdominal:      General: Bowel sounds are normal. There is no distension.      Palpations: Abdomen is soft.      Tenderness: There is no abdominal tenderness.   Musculoskeletal:         General: Normal range of motion.      Cervical back: Normal range of motion and neck supple.      Comments: AVF    Skin:     General: Skin is warm and dry.      Findings: No erythema.   Neurological:      Mental Status: He is alert and oriented to person, place, and time.      Cranial Nerves: No cranial nerve deficit.   Psychiatric:         Mood and Affect: Affect normal.         Cognition and Memory: Memory normal.         Judgment: Judgment normal.         Laboratory:  Recent Labs   Lab 07/03/24  0433 07/04/24  0210   WBC 5.62 6.21   HGB 11.3* 10.3*   HCT 36.2* 32.3*    209   MONO 10.9  0.6 11.0  0.7   EOSINOPHIL 3.0 1.8     Recent Labs   Lab 07/02/24  0523 07/03/24  0226 07/04/24  0210    133*  137 136   K 4.3 5.0  4.9 3.8    102  102 98   CO2  "21* 18*  19* 26   BUN 35* 42*  44* 24*   CREATININE 6.22* 7.8*  7.9* 5.2*   CALCIUM 8.3* 8.9  8.9 8.5*   PHOS  --  5.3* 3.5       Diagnostic Results:  X-Ray: Reviewed  US: Reviewed  Echo: Reviewed  ASSESSMENT/PLAN:     1. ESRD on HD via AVF MWF  - here for endocarditis work up with KURTIS on IV abx Vanx/Cefepime   -- HD7/3 with UF  -- Will check ID if Abx can be  give on HD MWF    -- Daily Renal Function Panel  -- Avoid Hypotension.  -- Renally dose all meds  -- Please avoid nephrotoxins, including NSAIDs, aminoglycosides, IV contrast (unless absolutely necessary), gadolinium, fleets and other phosphorous-based laxatives. Caution with antibiotics.    2. HTN  - controlled   3. Anemia of chronic kidney disease treated with JONH   No need for EPogen   Hg at goal >10   Recent Labs   Lab 07/03/24  0433 07/04/24  0210   HGB 11.3* 10.3*   HCT 36.2* 32.3*    209       Iron   Lab Results   Component Value Date    IRON 102 07/01/2024    TIBC 144 (L) 07/01/2024    FERRITIN 2,449.0 (H) 07/01/2024       4. MBD    Recent Labs   Lab 07/04/24  0210   CALCIUM 8.5*   PHOS 3.5   -- Binders TIDWM    Recent Labs   Lab 07/03/24  0226 07/04/24  0210   MG 2.4  2.4 2.0       Lab Results   Component Value Date     (H) 06/05/2024    CALCIUM 8.5 (L) 07/04/2024    PHOS 3.5 07/04/2024     No results found for: "[id number]"  Acidosis   -- correct with HD   Lab Results   Component Value Date    CO2 26 07/04/2024       5. Nutrition/Hypoalbuminemia   Recent Labs   Lab 07/03/24  0226 07/04/24  0210   ALBUMIN 2.9*  3.1* 2.8*     Nepro with meals TID. Renal vitamins daily      Thank you for allowing me to participate in care of your patient  With any question please call   Angela Quinn MD     [facility]  VAL Gonzalez MD,   MD AB Collins MD E. V. Harmon, NP  [address]   [place], [address]  [phone]  After hours answering service: [phone]   "

## 2024-07-04 NOTE — ASSESSMENT & PLAN NOTE
"- patient presented to OSH with acute volume overload, EF 35% on echo  Nuclear medicine Lexiscan stress test from 7/1/2024 (per chart review), low EF "mild transient ischemia for inferoapical scar but no evidence of Lexiscan induced ischemia at this time".     - volume overload appears improved with dialysis    Plan:  - Cardiology consulted, appreciate assistance.      "

## 2024-07-04 NOTE — ASSESSMENT & PLAN NOTE
Bacteremia, unclear source    6/30 blood cultures with with Staphylococcus species and  Acinetobacter calcoaceticus-baumannii complex  Transferred to [place] for concern of mitral valve vegetation.  However KURTIS findings did not show vegetation    Plan:  -vancomycin/cefepime change to meropenem and vancomycin based on culture results  -Repeat blood cultures  -Infectious disease consulted; appreciate recommendations.  - hx of c. Dif infection per patient, will defer to ID if prophylactic oral vanc would benefit patient  - cardiology consulted, appreciate assistance

## 2024-07-05 LAB
ANION GAP SERPL CALC-SCNC: 10 MMOL/L (ref 8–16)
BASOPHILS # BLD AUTO: 0.04 K/UL (ref 0–0.2)
BASOPHILS NFR BLD: 0.6 % (ref 0–1.9)
BUN SERPL-MCNC: 32 MG/DL (ref 8–23)
CALCIUM SERPL-MCNC: 8.6 MG/DL (ref 8.7–10.5)
CHLORIDE SERPL-SCNC: 96 MMOL/L (ref 95–110)
CO2 SERPL-SCNC: 26 MMOL/L (ref 23–29)
CREAT SERPL-MCNC: 5.3 MG/DL (ref 0.5–1.4)
DIFFERENTIAL METHOD BLD: ABNORMAL
EOSINOPHIL # BLD AUTO: 0.1 K/UL (ref 0–0.5)
EOSINOPHIL NFR BLD: 1.1 % (ref 0–8)
ERYTHROCYTE [DISTWIDTH] IN BLOOD BY AUTOMATED COUNT: 19.1 % (ref 11.5–14.5)
EST. GFR  (NO RACE VARIABLE): 11 ML/MIN/1.73 M^2
GLUCOSE SERPL-MCNC: 112 MG/DL (ref 70–110)
HCT VFR BLD AUTO: 39.8 % (ref 40–54)
HGB BLD-MCNC: 12.4 G/DL (ref 14–18)
IMM GRANULOCYTES # BLD AUTO: 0.02 K/UL (ref 0–0.04)
IMM GRANULOCYTES NFR BLD AUTO: 0.3 % (ref 0–0.5)
LYMPHOCYTES # BLD AUTO: 1.4 K/UL (ref 1–4.8)
LYMPHOCYTES NFR BLD: 20.5 % (ref 18–48)
MCH RBC QN AUTO: 30.5 PG (ref 27–31)
MCHC RBC AUTO-ENTMCNC: 31.2 G/DL (ref 32–36)
MCV RBC AUTO: 98 FL (ref 82–98)
MONOCYTES # BLD AUTO: 0.8 K/UL (ref 0.3–1)
MONOCYTES NFR BLD: 11.8 % (ref 4–15)
NEUTROPHILS # BLD AUTO: 4.6 K/UL (ref 1.8–7.7)
NEUTROPHILS NFR BLD: 65.7 % (ref 38–73)
NRBC BLD-RTO: 0 /100 WBC
PHOSPHATE SERPL-MCNC: 4.3 MG/DL (ref 2.7–4.5)
PLATELET # BLD AUTO: 222 K/UL (ref 150–450)
PMV BLD AUTO: 11.4 FL (ref 9.2–12.9)
POCT GLUCOSE: 112 MG/DL (ref 70–110)
POCT GLUCOSE: 132 MG/DL (ref 70–110)
POCT GLUCOSE: 93 MG/DL (ref 70–110)
POTASSIUM SERPL-SCNC: 3.9 MMOL/L (ref 3.5–5.1)
RBC # BLD AUTO: 4.07 M/UL (ref 4.6–6.2)
SODIUM SERPL-SCNC: 132 MMOL/L (ref 136–145)
VANCOMYCIN SERPL-MCNC: 10.7 UG/ML
WBC # BLD AUTO: 7.01 K/UL (ref 3.9–12.7)

## 2024-07-05 PROCEDURE — 99900035 HC TECH TIME PER 15 MIN (STAT)

## 2024-07-05 PROCEDURE — 25000003 PHARM REV CODE 250: Performed by: FAMILY MEDICINE

## 2024-07-05 PROCEDURE — 99232 SBSQ HOSP IP/OBS MODERATE 35: CPT | Mod: ,,, | Performed by: INTERNAL MEDICINE

## 2024-07-05 PROCEDURE — 63600175 PHARM REV CODE 636 W HCPCS: Performed by: STUDENT IN AN ORGANIZED HEALTH CARE EDUCATION/TRAINING PROGRAM

## 2024-07-05 PROCEDURE — 94761 N-INVAS EAR/PLS OXIMETRY MLT: CPT

## 2024-07-05 PROCEDURE — 25000003 PHARM REV CODE 250: Performed by: REGISTERED NURSE

## 2024-07-05 PROCEDURE — 63600175 PHARM REV CODE 636 W HCPCS: Performed by: HOSPITALIST

## 2024-07-05 PROCEDURE — 80048 BASIC METABOLIC PNL TOTAL CA: CPT | Performed by: HOSPITALIST

## 2024-07-05 PROCEDURE — 84100 ASSAY OF PHOSPHORUS: CPT | Performed by: FAMILY MEDICINE

## 2024-07-05 PROCEDURE — 36415 COLL VENOUS BLD VENIPUNCTURE: CPT | Performed by: HOSPITALIST

## 2024-07-05 PROCEDURE — 85025 COMPLETE CBC W/AUTO DIFF WBC: CPT | Performed by: FAMILY MEDICINE

## 2024-07-05 PROCEDURE — 25000003 PHARM REV CODE 250: Performed by: STUDENT IN AN ORGANIZED HEALTH CARE EDUCATION/TRAINING PROGRAM

## 2024-07-05 PROCEDURE — 25000003 PHARM REV CODE 250: Performed by: HOSPITALIST

## 2024-07-05 PROCEDURE — 80202 ASSAY OF VANCOMYCIN: CPT | Performed by: HOSPITALIST

## 2024-07-05 PROCEDURE — 11000001 HC ACUTE MED/SURG PRIVATE ROOM

## 2024-07-05 PROCEDURE — 63600175 PHARM REV CODE 636 W HCPCS: Performed by: FAMILY MEDICINE

## 2024-07-05 PROCEDURE — 36415 COLL VENOUS BLD VENIPUNCTURE: CPT | Performed by: FAMILY MEDICINE

## 2024-07-05 RX ORDER — FAMOTIDINE 20 MG/1
20 TABLET, FILM COATED ORAL NIGHTLY
Status: DISCONTINUED | OUTPATIENT
Start: 2024-07-05 | End: 2024-07-08 | Stop reason: HOSPADM

## 2024-07-05 RX ORDER — TALC
6 POWDER (GRAM) TOPICAL NIGHTLY PRN
Status: DISCONTINUED | OUTPATIENT
Start: 2024-07-05 | End: 2024-07-08 | Stop reason: HOSPADM

## 2024-07-05 RX ADMIN — SEVELAMER CARBONATE 800 MG: 800 TABLET, FILM COATED ORAL at 04:07

## 2024-07-05 RX ADMIN — VANCOMYCIN HYDROCHLORIDE 1000 MG: 1 INJECTION, POWDER, LYOPHILIZED, FOR SOLUTION INTRAVENOUS at 12:07

## 2024-07-05 RX ADMIN — HEPARIN SODIUM 5000 UNITS: 5000 INJECTION INTRAVENOUS; SUBCUTANEOUS at 02:07

## 2024-07-05 RX ADMIN — MINOXIDIL 5 MG: 2.5 TABLET ORAL at 08:07

## 2024-07-05 RX ADMIN — SEVELAMER CARBONATE 800 MG: 800 TABLET, FILM COATED ORAL at 08:07

## 2024-07-05 RX ADMIN — AMLODIPINE BESYLATE 10 MG: 5 TABLET ORAL at 08:07

## 2024-07-05 RX ADMIN — ASPIRIN 81 MG: 81 TABLET, COATED ORAL at 08:07

## 2024-07-05 RX ADMIN — MUPIROCIN: 20 OINTMENT TOPICAL at 08:07

## 2024-07-05 RX ADMIN — FAMOTIDINE 20 MG: 20 TABLET ORAL at 08:07

## 2024-07-05 RX ADMIN — CARVEDILOL 25 MG: 25 TABLET, FILM COATED ORAL at 08:07

## 2024-07-05 RX ADMIN — Medication 6 MG: at 10:07

## 2024-07-05 RX ADMIN — SEVELAMER CARBONATE 800 MG: 800 TABLET, FILM COATED ORAL at 12:07

## 2024-07-05 RX ADMIN — ATORVASTATIN CALCIUM 80 MG: 40 TABLET, FILM COATED ORAL at 06:07

## 2024-07-05 RX ADMIN — MEROPENEM 500 MG: 500 INJECTION, POWDER, FOR SOLUTION INTRAVENOUS at 08:07

## 2024-07-05 RX ADMIN — CLOPIDOGREL BISULFATE 75 MG: 75 TABLET ORAL at 08:07

## 2024-07-05 NOTE — ASSESSMENT & PLAN NOTE
Bacteremia, unclear source    6/30 blood cultures with with Staphylococcus species and  Acinetobacter calcoaceticus-baumannii complex  Transferred to [place] for concern of mitral valve vegetation.  However KURTIS findings did not show vegetation    Plan:  -vancomycin/cefepime change to meropenem based on culture results.  Vancomycin has been discontinued  -Repeat blood cultures  -Infectious disease consulted; appreciate recommendations.  - hx of c. Dif infection per patient, will defer to ID if prophylactic oral vanc would benefit patient  - cardiology consulted, appreciate assistance

## 2024-07-05 NOTE — PROGRESS NOTES
07/05/24 1328   Post-Hemodialysis Assessment   Rinseback Volume (mL) 250 mL   Blood Volume Processed (Liters) 96 L   Dialyzer Clearance Clear   Duration of Treatment 240 minutes   Additional Fluid Intake (mL) 500 mL   Total UF (mL) 2750 mL   Net Fluid Removal 2000   Patient Response to Treatment tolerated well   Post-Hemodialysis Comments post HD report given to the primary nurse

## 2024-07-05 NOTE — ASSESSMENT & PLAN NOTE
68 y.o. male with a past medical history of CAD (5 vessel CABG 2005), PVD (bilateral BKA) ESRD (HD MWF, via fistula), history of C. dif who presents as a transfer due to concern for endocarditis.    -outside cultures with acinetobacter and corynebacterium (this is a likely contaminant)  -cultures here are negative   -KURTIS done here with possible endocarditis but other diagnosis considered more likely by read  -given culture data would stop vancomycin  -continue meropenem  -would dose daily as there is only one small study which looked at dosing with HD  -would treat for 2 weeks (stop date 7/17)  -ID will sign off

## 2024-07-05 NOTE — PLAN OF CARE
07/05/24 0905   Rounds   Attendance Nurse ;Provider   Discharge Plan A Long-term acute care facility (LTAC)  ([facility])   Why the patient remains in the hospital Requires continued medical care   Transition of Care Barriers Transportation       0905  NIKI was informed by Dr Moore that the pt is not medically stable to discharge due to pending cx & final ID recs needed.     1120  Pt off the unit receiving his HD treatment when CM rounded. No family present. BLE prosthesis noted at the bedside.     1140  [facility]. Awaiting response.       Will continue to follow.

## 2024-07-05 NOTE — ASSESSMENT & PLAN NOTE
- Known CAD s/p CABG. Had elevated troponins at OSH, evaluated by cardiology. [place] to be Type II NSTEMI. SPECT at OSH reportedly negative    Plan:  - aspirin, statin

## 2024-07-05 NOTE — PROGRESS NOTES
Pharmacist Renal Dose Adjustment Note    Alexis Aponte is a 68 y.o. male being treated with the medication pepcid    Patient Data:    Vital Signs (Most Recent):  Temp: 97.8 °F (36.6 °C) (07/05/24 0730)  Pulse: 78 (07/05/24 0730)  Resp: 20 (07/05/24 0730)  BP: (!) 140/66 (07/05/24 0730)  SpO2: 96 % (07/05/24 0712) Vital Signs (72h Range):  Temp:  [96 °F (35.6 °C)-98.5 °F (36.9 °C)]   Pulse:  [61-84]   Resp:  [16-27]   BP: ()/(53-90)   SpO2:  [78 %-99 %]      Recent Labs   Lab 07/03/24  0226 07/04/24  0210 07/05/24  1010   CREATININE 7.8*  7.9* 5.2* 5.3*     Serum creatinine: 5.3 mg/dL (H) 07/05/24 1010  Estimated creatinine clearance: 9.3 mL/min (A)    Medication:pepcid dose: 40mg frequency q24h will be changed to medication:pepcid dose:20mg frequency:q24h    Pharmacist's Name: Lisandro Aguilar  Pharmacist's Extension: 7503

## 2024-07-05 NOTE — PLAN OF CARE
Problem: Adult Inpatient Plan of Care  Goal: Plan of Care Review  7/5/2024 0712 by Es Marino, RN  Outcome: Progressing     Problem: Hemodialysis  Goal: Absence of Infection Signs and Symptoms  7/5/2024 0711 by Es Marino, RN  Outcome: Progressing

## 2024-07-05 NOTE — PT/OT/SLP PROGRESS
Physical Therapy      Patient Name:  Alexis Aponte   MRN:  [id number]    Patient not seen today secondary to Dialysis. Will follow-up as able.

## 2024-07-05 NOTE — PROCEDURES
Patient seen and examined on dialysis. Tolerating HD well  Awaiting final recs from ID for bacteremia management - HD dosing vs daily   Vitals:    07/05/24 0422 07/05/24 0508 07/05/24 0712 07/05/24 0730   BP: 118/65   (!) 140/66   BP Location: Right arm   Right arm   Patient Position: Lying   Sitting   Pulse: 82 84 76 78   Resp: 20   20   Temp: 98.3 °F (36.8 °C)   97.8 °F (36.6 °C)   TempSrc: Oral   Oral   SpO2: (!) 94%  96%    Weight:       Height:           With any question please call  [phone]  ROSALEE Quinn MD    [facility]     VAL Gonzalez MD,   OMD ROSALEE Alanis MD E. V. Harmon, NP I.Goldvarg-Abud, NP    [address]  [place], [id number]

## 2024-07-05 NOTE — PROGRESS NOTES
[facility]  Infectious Disease  Progress Note    Patient Name: Alexis Aponte  MRN: [id number]  Admission Date: 7/3/2024  Length of Stay: 2 days  Attending Physician: Bert Moore MD  Primary Care Provider: Richie Cherry MD    Isolation Status: No active isolations  Assessment/Plan:      ID  Bacteremia  68 y.o. male with a past medical history of CAD (5 vessel CABG 2005), PVD (bilateral BKA) ESRD (HD MWF, via fistula), history of C. dif who presents as a transfer due to concern for endocarditis.    -outside cultures with acinetobacter and corynebacterium (this is a likely contaminant)  -cultures here are negative   -KURTIS done here with possible endocarditis but other diagnosis considered more likely by read  -given culture data would stop vancomycin  -continue meropenem  -would dose daily as there is only one small study which looked at dosing with HD  -would treat for 2 weeks (stop date 7/17)  -ID will sign off        Thank you for your consult. I will sign off. Please contact us if you have any additional questions.    Damian Oropeza MD  Infectious Disease  [place] - [facility]    Subjective:     Principal Problem:Bacterial endocarditis    HPI: 68 y.o. male with a past medical history of CAD (5 vessel CABG 2005), PVD (bilateral BKA) ESRD (HD MWF, via fistula), history of C. dif who presents as a transfer due to concern for endocarditis. He was admitted to [facility] on 6/30 for shortness of breath. As part of his work-up, found to have new systolic heart failure (EF 30-35%) with possible vegetation on the mitral valve. Admission blood cultures at OSH were positive for Acinetobacter and stpah (per transfer note). Of note, troponin and BNP were elevated on admission. Cardiology consulted at OSH and felt it was due to demand ischemia. A KURTIS was recommended, so he was transferred to [facility]. Overall, he feels his symptoms have improved since admission. He initially felt SOB on the day  of admission, that has since improved after receiving dialysis at OSH. Remains on 2L of N.C. No chest pain, no nausea, no subjective fevers/kills, and no diarrhea.     KURTIS done here was read as having calcified nodule on his mitral valve.The read states this is less likely to be infection vs. degeneration of the valve. Blood cxs here are ngtd. He is on vanco and cefepime  Interval History: No acute events    Review of Systems   Constitutional:  Negative for chills and fever.   Respiratory:  Negative for cough and shortness of breath.    Cardiovascular:  Negative for chest pain and leg swelling.   Gastrointestinal:  Negative for abdominal distention, abdominal pain, diarrhea, nausea and vomiting.   Neurological:  Negative for dizziness.   Psychiatric/Behavioral:  Negative for agitation and confusion.      Objective:     Vital Signs (Most Recent):  Temp: 97.8 °F (36.6 °C) (07/05/24 0730)  Pulse: 78 (07/05/24 0730)  Resp: 20 (07/05/24 0730)  BP: (!) 140/66 (07/05/24 0730)  SpO2: 96 % (07/05/24 0712) Vital Signs (24h Range):  Temp:  [97.8 °F (36.6 °C)-98.3 °F (36.8 °C)] 97.8 °F (36.6 °C)  Pulse:  [71-84] 78  Resp:  [18-20] 20  SpO2:  [93 %-98 %] 96 %  BP: (118-140)/(61-66) 140/66     Weight: 68.6 kg (151 lb 3.8 oz)  Body mass index is 46.15 kg/m².    Estimated Creatinine Clearance: 9.5 mL/min (A) (based on SCr of 5.2 mg/dL (H)).     Physical Exam  Constitutional:       General: He is not in acute distress.     Appearance: He is ill-appearing.   HENT:      Head: Normocephalic and atraumatic.      Mouth/Throat:      Mouth: Mucous membranes are moist.      Pharynx: Oropharynx is clear.   Eyes:      General: No scleral icterus.     Extraocular Movements: Extraocular movements intact.   Cardiovascular:      Rate and Rhythm: Normal rate and regular rhythm.      Heart sounds: No murmur heard.     No friction rub. No gallop.   Pulmonary:      Effort: Pulmonary effort is normal.      Breath sounds: Normal breath sounds.    Musculoskeletal:         General: No swelling or tenderness.      Cervical back: Normal range of motion and neck supple.   Skin:     Comments: Small ulcerations noted on bilateral BKA stump.    Neurological:      General: No focal deficit present.      Mental Status: He is oriented to person, place, and time.   Psychiatric:         Mood and Affect: Mood normal.         Behavior: Behavior normal.          Significant Labs: All pertinent labs within the past 24 hours have been reviewed.    Significant Imaging: I have reviewed all pertinent imaging results/findings within the past 24 hours.

## 2024-07-05 NOTE — SUBJECTIVE & OBJECTIVE
Interval History:     No acute events overnight   Patient no complaints  Denies fever chills nausea or vomiting  Is eager to go home    Review of Systems   Constitutional:  Negative for chills and fever.   Respiratory:  Negative for cough and shortness of breath.    Cardiovascular:  Negative for chest pain and leg swelling.   Gastrointestinal:  Negative for abdominal distention, abdominal pain, diarrhea, nausea and vomiting.   Neurological:  Negative for dizziness.   Psychiatric/Behavioral:  Negative for agitation and confusion.      Objective:     Vital Signs (Most Recent):  Temp: 97.6 °F (36.4 °C) (07/05/24 1541)  Pulse: 80 (07/05/24 1541)  Resp: 18 (07/05/24 1541)  BP: 133/63 (07/05/24 1541)  SpO2: 95 % (07/05/24 1541) Vital Signs (24h Range):  Temp:  [97.6 °F (36.4 °C)-98.3 °F (36.8 °C)] 97.6 °F (36.4 °C)  Pulse:  [72-84] 80  Resp:  [18-20] 18  SpO2:  [93 %-98 %] 95 %  BP: (118-140)/(61-66) 133/63     Weight: 68.6 kg (151 lb 3.8 oz)  Body mass index is 46.15 kg/m².    Intake/Output Summary (Last 24 hours) at 7/5/2024 1601  Last data filed at 7/5/2024 1328  Gross per 24 hour   Intake 860 ml   Output 2750 ml   Net -1890 ml         Physical Exam  Constitutional:       General: He is not in acute distress.     Appearance: He is ill-appearing. He is not toxic-appearing.   HENT:      Mouth/Throat:      Mouth: Mucous membranes are moist.      Pharynx: Oropharynx is clear.   Eyes:      Conjunctiva/sclera: Conjunctivae normal.   Cardiovascular:      Rate and Rhythm: Normal rate and regular rhythm.   Pulmonary:      Effort: Pulmonary effort is normal. No respiratory distress.      Breath sounds: Normal breath sounds. No wheezing.   Abdominal:      General: Bowel sounds are normal. There is no distension.      Palpations: Abdomen is soft.      Tenderness: There is no abdominal tenderness.   Musculoskeletal:         General: Normal range of motion.      Comments: Status post BKA bilateral   Skin:     General: Skin is warm  and dry.   Neurological:      General: No focal deficit present.      Mental Status: He is alert and oriented to person, place, and time.   Psychiatric:         Mood and Affect: Mood normal.         Behavior: Behavior normal.             Significant Labs: All pertinent labs within the past 24 hours have been reviewed.    Significant Imaging: I have reviewed all pertinent imaging results/findings within the past 24 hours.

## 2024-07-05 NOTE — PROGRESS NOTES
[facility] Medicine  Progress Note    Patient Name: Alexis Aponte  MRN: [id number]  Patient Class: IP- Inpatient   Admission Date: 7/3/2024  Length of Stay: 2 days  Attending Physician: Bert Moore MD  Primary Care Provider: Richie Cherry MD        Subjective:     Principal Problem:Bacterial endocarditis        HPI:  68 y.o. male with a past medical history of CAD (5 vessel CABG 2005), PVD (bilateral BKA) ESRD (HD MWF, via fistula), history of C. dif who presents as a transfer due to concern for endocarditis. He was admitted to [facility] on 6/30 for shortness of breath. As part of his work-up, found to have new systolic heart failure (EF 30-35%) with possible vegetation on the mitral valve. Admission blood cultures at OSH were positive for Acinetobacter and CONS (per transfer note). Of note, troponin and BNP were elevated on admission. Cardiology consulted at OSH and felt it was due to demand ischemia. A KURTIS was recommended, so he was transferred to [facility]. D-dimer elevated at OSH, CTPE performed without evidence of embolism.     Overall, he feels his symptoms have improved since admission. He initially felt SOB on the day of admission, that has since improved after receiving dialysis at OSH. Remains on 2L of N.C. No chest pain, no nausea, no subjective fevers/kills, no diarrhea.        Overview/Hospital Course:  No notes on file    Interval History:     No acute events overnight   Patient no complaints  Denies fever chills nausea or vomiting  Is eager to go home    Review of Systems   Constitutional:  Negative for chills and fever.   Respiratory:  Negative for cough and shortness of breath.    Cardiovascular:  Negative for chest pain and leg swelling.   Gastrointestinal:  Negative for abdominal distention, abdominal pain, diarrhea, nausea and vomiting.   Neurological:  Negative for dizziness.   Psychiatric/Behavioral:  Negative for agitation and confusion.       Objective:     Vital Signs (Most Recent):  Temp: 97.6 °F (36.4 °C) (07/05/24 1541)  Pulse: 80 (07/05/24 1541)  Resp: 18 (07/05/24 1541)  BP: 133/63 (07/05/24 1541)  SpO2: 95 % (07/05/24 1541) Vital Signs (24h Range):  Temp:  [97.6 °F (36.4 °C)-98.3 °F (36.8 °C)] 97.6 °F (36.4 °C)  Pulse:  [72-84] 80  Resp:  [18-20] 18  SpO2:  [93 %-98 %] 95 %  BP: (118-140)/(61-66) 133/63     Weight: 68.6 kg (151 lb 3.8 oz)  Body mass index is 46.15 kg/m².    Intake/Output Summary (Last 24 hours) at 7/5/2024 1601  Last data filed at 7/5/2024 1328  Gross per 24 hour   Intake 860 ml   Output 2750 ml   Net -1890 ml         Physical Exam  Constitutional:       General: He is not in acute distress.     Appearance: He is ill-appearing. He is not toxic-appearing.   HENT:      Mouth/Throat:      Mouth: Mucous membranes are moist.      Pharynx: Oropharynx is clear.   Eyes:      Conjunctiva/sclera: Conjunctivae normal.   Cardiovascular:      Rate and Rhythm: Normal rate and regular rhythm.   Pulmonary:      Effort: Pulmonary effort is normal. No respiratory distress.      Breath sounds: Normal breath sounds. No wheezing.   Abdominal:      General: Bowel sounds are normal. There is no distension.      Palpations: Abdomen is soft.      Tenderness: There is no abdominal tenderness.   Musculoskeletal:         General: Normal range of motion.      Comments: Status post BKA bilateral   Skin:     General: Skin is warm and dry.   Neurological:      General: No focal deficit present.      Mental Status: He is alert and oriented to person, place, and time.   Psychiatric:         Mood and Affect: Mood normal.         Behavior: Behavior normal.             Significant Labs: All pertinent labs within the past 24 hours have been reviewed.    Significant Imaging: I have reviewed all pertinent imaging results/findings within the past 24 hours.    Assessment/Plan:      * Bacterial endocarditis  Bacteremia, unclear source    6/30 blood cultures with with  "Staphylococcus species and  Acinetobacter calcoaceticus-baumannii complex  Transferred to [facility] for concern of mitral valve vegetation.  However KURTIS findings did not show vegetation    Plan:  -vancomycin/cefepime change to meropenem based on culture results.  Vancomycin has been discontinued  -Repeat blood cultures  -Infectious disease consulted; appreciate recommendations.  - hx of c. Dif infection per patient, will defer to ID if prophylactic oral vanc would benefit patient  - cardiology consulted, appreciate assistance      Bacteremia        Acute hypoxic respiratory failure  Patient with Hypoxic Respiratory failure which is Acute.  he is not on home oxygen. Supplemental oxygen was provided and noted-      .   Signs/symptoms of respiratory failure include- respiratory distress. Contributing diagnoses includes -  volume overload  Labs and images were reviewed. Patient Has not had a recent ABG. Will treat underlying causes and adjust management of respiratory failure as follows- continue dialysis    BKA stump complication  - skin breakdown noted on bilateral stumps, possible diabetic ulceration.   - no obvious signs of infection    Plan:  - wound care consult placed      Acute systolic heart failure  - patient presented to OSH with acute volume overload, EF 35% on echo  Nuclear medicine Lexiscan stress test from 7/1/2024 (per chart review), low EF "mild transient ischemia for inferoapical scar but no evidence of Lexiscan induced ischemia at this time".     - volume overload appears improved with dialysis    Plan:  - Cardiology consulted, appreciate assistance.        Anemia in chronic kidney disease, on chronic dialysis  - hgb appears stable    ACP (advance care planning)  - previous documentation of his wish to be DNR.     Ongoing goals care discussion        Hyperlipidemia  - Continue statin      Coronary artery disease  - Known CAD s/p CABG. Had elevated troponins at OSH, evaluated by cardiology. [place] to be " Type II NSTEMI. SPECT at OSH reportedly negative    Plan:  - aspirin, statin      Type 2 diabetes mellitus, without long-term current use of insulin  - A1c 5.6 on 7/1  - glucose checks          Essential hypertension  Currently normotensive    Plan:  - continue current regimen  - will defer to renal for overall BP changes  - home regimen included clonidine. Per chart review, he was well controlled on current regimen. Will discontinue for now. Can consider if evidence of rebound hypertension    ESRD (end stage renal disease)  Receives dialysis MWF    Plan  - consult to nephrology for inpatient dialysis management      VTE Risk Mitigation (From admission, onward)           Ordered     heparin (porcine) injection 5,000 Units  Every 8 hours         07/03/24 0109     IP VTE HIGH RISK PATIENT  Once         07/03/24 0109     Place sequential compression device  Until discontinued         07/03/24 0109                    Discharge Planning   JENNIFER: 7/8/2024     Code Status: Full Code   Is the patient medically ready for discharge?:     Reason for patient still in hospital (select all that apply): Patient trending condition, Laboratory test, and Treatment  Discharge Plan A: Long-term acute care facility (LTAC) ([facility]ly [facility])                  Bert Moore MD  Department of Hospital Medicine   [place] - [facility]

## 2024-07-05 NOTE — PT/OT/SLP PROGRESS
Occupational Therapy      Patient Name:  Alexis Aponte   MRN:  [id number]    Patient not seen today secondary to AM 1055: Dialysis.    7/5/2024

## 2024-07-05 NOTE — SUBJECTIVE & OBJECTIVE
Interval History: No acute events    Review of Systems   Constitutional:  Negative for chills and fever.   Respiratory:  Negative for cough and shortness of breath.    Cardiovascular:  Negative for chest pain and leg swelling.   Gastrointestinal:  Negative for abdominal distention, abdominal pain, diarrhea, nausea and vomiting.   Neurological:  Negative for dizziness.   Psychiatric/Behavioral:  Negative for agitation and confusion.      Objective:     Vital Signs (Most Recent):  Temp: 97.8 °F (36.6 °C) (07/05/24 0730)  Pulse: 78 (07/05/24 0730)  Resp: 20 (07/05/24 0730)  BP: (!) 140/66 (07/05/24 0730)  SpO2: 96 % (07/05/24 0712) Vital Signs (24h Range):  Temp:  [97.8 °F (36.6 °C)-98.3 °F (36.8 °C)] 97.8 °F (36.6 °C)  Pulse:  [71-84] 78  Resp:  [18-20] 20  SpO2:  [93 %-98 %] 96 %  BP: (118-140)/(61-66) 140/66     Weight: 68.6 kg (151 lb 3.8 oz)  Body mass index is 46.15 kg/m².    Estimated Creatinine Clearance: 9.5 mL/min (A) (based on SCr of 5.2 mg/dL (H)).     Physical Exam  Constitutional:       General: He is not in acute distress.     Appearance: He is ill-appearing.   HENT:      Head: Normocephalic and atraumatic.      Mouth/Throat:      Mouth: Mucous membranes are moist.      Pharynx: Oropharynx is clear.   Eyes:      General: No scleral icterus.     Extraocular Movements: Extraocular movements intact.   Cardiovascular:      Rate and Rhythm: Normal rate and regular rhythm.      Heart sounds: No murmur heard.     No friction rub. No gallop.   Pulmonary:      Effort: Pulmonary effort is normal.      Breath sounds: Normal breath sounds.   Musculoskeletal:         General: No swelling or tenderness.      Cervical back: Normal range of motion and neck supple.   Skin:     Comments: Small ulcerations noted on bilateral BKA stump.    Neurological:      General: No focal deficit present.      Mental Status: He is oriented to person, place, and time.   Psychiatric:         Mood and Affect: Mood normal.         Behavior:  Behavior normal.          Significant Labs: All pertinent labs within the past 24 hours have been reviewed.    Significant Imaging: I have reviewed all pertinent imaging results/findings within the past 24 hours.

## 2024-07-05 NOTE — PROGRESS NOTES
[facility]                   Pharmacy  Pharmacy Vancomycin/AG Sign-off    Therapy with vancomycin completed and/or consult discontinued by provider.  Pharmacy will sign off, please re-consult as needed. Thank you for allowing us to participate in this patient's care.     Dylan Aguilar, PharmD    [phone]

## 2024-07-06 LAB
ANION GAP SERPL CALC-SCNC: 10 MMOL/L (ref 8–16)
BASOPHILS # BLD AUTO: 0.02 K/UL (ref 0–0.2)
BASOPHILS NFR BLD: 0.3 % (ref 0–1.9)
BUN SERPL-MCNC: 21 MG/DL (ref 8–23)
CALCIUM SERPL-MCNC: 8.8 MG/DL (ref 8.7–10.5)
CHLORIDE SERPL-SCNC: 97 MMOL/L (ref 95–110)
CO2 SERPL-SCNC: 24 MMOL/L (ref 23–29)
CREAT SERPL-MCNC: 4.7 MG/DL (ref 0.5–1.4)
DIFFERENTIAL METHOD BLD: ABNORMAL
EOSINOPHIL # BLD AUTO: 0.2 K/UL (ref 0–0.5)
EOSINOPHIL NFR BLD: 2.8 % (ref 0–8)
ERYTHROCYTE [DISTWIDTH] IN BLOOD BY AUTOMATED COUNT: 18.5 % (ref 11.5–14.5)
EST. GFR  (NO RACE VARIABLE): 13 ML/MIN/1.73 M^2
GLUCOSE SERPL-MCNC: 99 MG/DL (ref 70–110)
HCT VFR BLD AUTO: 32.5 % (ref 40–54)
HGB BLD-MCNC: 10.6 G/DL (ref 14–18)
IMM GRANULOCYTES # BLD AUTO: 0.01 K/UL (ref 0–0.04)
IMM GRANULOCYTES NFR BLD AUTO: 0.2 % (ref 0–0.5)
LYMPHOCYTES # BLD AUTO: 1.3 K/UL (ref 1–4.8)
LYMPHOCYTES NFR BLD: 22 % (ref 18–48)
MCH RBC QN AUTO: 30.2 PG (ref 27–31)
MCHC RBC AUTO-ENTMCNC: 32.6 G/DL (ref 32–36)
MCV RBC AUTO: 93 FL (ref 82–98)
MONOCYTES # BLD AUTO: 0.8 K/UL (ref 0.3–1)
MONOCYTES NFR BLD: 12.9 % (ref 4–15)
NEUTROPHILS # BLD AUTO: 3.7 K/UL (ref 1.8–7.7)
NEUTROPHILS NFR BLD: 61.8 % (ref 38–73)
NRBC BLD-RTO: 0 /100 WBC
PLATELET # BLD AUTO: 227 K/UL (ref 150–450)
PMV BLD AUTO: 10.8 FL (ref 9.2–12.9)
POCT GLUCOSE: 145 MG/DL (ref 70–110)
POCT GLUCOSE: 145 MG/DL (ref 70–110)
POCT GLUCOSE: 87 MG/DL (ref 70–110)
POCT GLUCOSE: 90 MG/DL (ref 70–110)
POTASSIUM SERPL-SCNC: 4.2 MMOL/L (ref 3.5–5.1)
RBC # BLD AUTO: 3.51 M/UL (ref 4.6–6.2)
SODIUM SERPL-SCNC: 131 MMOL/L (ref 136–145)
WBC # BLD AUTO: 6.05 K/UL (ref 3.9–12.7)

## 2024-07-06 PROCEDURE — 97116 GAIT TRAINING THERAPY: CPT

## 2024-07-06 PROCEDURE — 25000003 PHARM REV CODE 250: Performed by: HOSPITALIST

## 2024-07-06 PROCEDURE — 85025 COMPLETE CBC W/AUTO DIFF WBC: CPT | Performed by: HOSPITALIST

## 2024-07-06 PROCEDURE — 11000001 HC ACUTE MED/SURG PRIVATE ROOM

## 2024-07-06 PROCEDURE — 80048 BASIC METABOLIC PNL TOTAL CA: CPT | Performed by: HOSPITALIST

## 2024-07-06 PROCEDURE — 25000003 PHARM REV CODE 250: Performed by: FAMILY MEDICINE

## 2024-07-06 PROCEDURE — 63600175 PHARM REV CODE 636 W HCPCS: Performed by: HOSPITALIST

## 2024-07-06 PROCEDURE — 63600175 PHARM REV CODE 636 W HCPCS: Performed by: FAMILY MEDICINE

## 2024-07-06 PROCEDURE — 36415 COLL VENOUS BLD VENIPUNCTURE: CPT | Performed by: HOSPITALIST

## 2024-07-06 PROCEDURE — 25000003 PHARM REV CODE 250: Performed by: STUDENT IN AN ORGANIZED HEALTH CARE EDUCATION/TRAINING PROGRAM

## 2024-07-06 PROCEDURE — 94761 N-INVAS EAR/PLS OXIMETRY MLT: CPT

## 2024-07-06 PROCEDURE — 97530 THERAPEUTIC ACTIVITIES: CPT

## 2024-07-06 RX ADMIN — HEPARIN SODIUM 5000 UNITS: 5000 INJECTION INTRAVENOUS; SUBCUTANEOUS at 02:07

## 2024-07-06 RX ADMIN — AMLODIPINE BESYLATE 10 MG: 5 TABLET ORAL at 08:07

## 2024-07-06 RX ADMIN — MUPIROCIN: 20 OINTMENT TOPICAL at 09:07

## 2024-07-06 RX ADMIN — HEPARIN SODIUM 5000 UNITS: 5000 INJECTION INTRAVENOUS; SUBCUTANEOUS at 09:07

## 2024-07-06 RX ADMIN — ATORVASTATIN CALCIUM 80 MG: 40 TABLET, FILM COATED ORAL at 06:07

## 2024-07-06 RX ADMIN — MINOXIDIL 5 MG: 2.5 TABLET ORAL at 08:07

## 2024-07-06 RX ADMIN — SEVELAMER CARBONATE 800 MG: 800 TABLET, FILM COATED ORAL at 08:07

## 2024-07-06 RX ADMIN — FAMOTIDINE 20 MG: 20 TABLET ORAL at 09:07

## 2024-07-06 RX ADMIN — SEVELAMER CARBONATE 800 MG: 800 TABLET, FILM COATED ORAL at 04:07

## 2024-07-06 RX ADMIN — SEVELAMER CARBONATE 800 MG: 800 TABLET, FILM COATED ORAL at 11:07

## 2024-07-06 RX ADMIN — CLOPIDOGREL BISULFATE 75 MG: 75 TABLET ORAL at 08:07

## 2024-07-06 RX ADMIN — MINOXIDIL 5 MG: 2.5 TABLET ORAL at 09:07

## 2024-07-06 RX ADMIN — CARVEDILOL 25 MG: 25 TABLET, FILM COATED ORAL at 08:07

## 2024-07-06 RX ADMIN — MUPIROCIN: 20 OINTMENT TOPICAL at 08:07

## 2024-07-06 RX ADMIN — CARVEDILOL 25 MG: 25 TABLET, FILM COATED ORAL at 09:07

## 2024-07-06 RX ADMIN — MEROPENEM 500 MG: 500 INJECTION, POWDER, FOR SOLUTION INTRAVENOUS at 04:07

## 2024-07-06 NOTE — PLAN OF CARE
Patient seen for physical therapy treatment on this date.  Patient's daughter brought in patient's prostheses.  Patient donns B prostheses at modified independent.  Patient performs standing with SBA with RW and prostheses.  Patient then ambulates with RW x ~ 200' with CGA on level surfaces.   Full report to follow.      Problem: Physical Therapy  Goal: Physical Therapy Goal  Description: Goals to be met by: 24     Patient will increase functional independence with mobility by performin.  Bed to/from chair with bilateral prosthesis and RW with Mod Ind  2.  Claudy bilateral prosthesis with Mod Ind: MET 2024  3.  Ambulate 150' with RW with bilateral prosthesis:  MET 2024  Update:  ambulate 250' with RW and B prosthesis at Mod I  4.  Up in chair 2 hours  5.  Transfer bed to/from w/c with slide board with Mod Ind    Outcome: Progressing

## 2024-07-06 NOTE — PROGRESS NOTES
Nephrology Progress Note       Consult Requested By: Bert Moore MD  Reason for Consult: ESRD      SUBJECTIVE:       ?    Review of Systems   Constitutional:  Positive for malaise/fatigue. Negative for chills and fever.   HENT:  Negative for congestion and sore throat.    Eyes:  Negative for blurred vision, double vision and photophobia.   Respiratory:  Negative for cough and shortness of breath.    Cardiovascular:  Negative for chest pain, palpitations and leg swelling.   Gastrointestinal:  Negative for abdominal pain, diarrhea, nausea and vomiting.   Genitourinary:  Negative for dysuria and urgency.   Musculoskeletal:  Negative for joint pain and myalgias.   Skin:  Negative for itching and rash.   Neurological:  Negative for dizziness, sensory change, weakness and headaches.   Endo/Heme/Allergies:  Negative for polydipsia. Does not bruise/bleed easily.   Psychiatric/Behavioral:  Negative for depression.        Past Medical History:   Diagnosis Date    Anticoagulant long-term use     Carotid stenosis     Coronary artery disease     Diabetes mellitus     diet controlled    Dialysis patient     Disorder of kidney and ureter     ESRD (end stage renal disease)     GERD (gastroesophageal reflux disease)     Heart failure     Hyperlipidemia     Hypertension     PVD (peripheral vascular disease)     Sleep apnea     np cpap          OBJECTIVE:     Vital Signs (Most Recent)  Vitals:    07/06/24 0423 07/06/24 0726 07/06/24 0743 07/06/24 1133   BP: (!) 121/58  (!) 142/66 (!) 109/55   BP Location: Right arm      Patient Position: Lying  Lying Lying   Pulse: 71 73 74 73   Resp: 17  18 18   Temp: 97.9 °F (36.6 °C)  97.9 °F (36.6 °C) 98.1 °F (36.7 °C)   TempSrc: Oral  Oral Oral   SpO2: 96% 96% (!) 94% 95%   Weight:       Height:                       Medications:   0.9% NaCl   Intravenous Once    amLODIPine  10 mg Oral Daily    aspirin  81 mg Oral Every other day    atorvastatin  80 mg Oral QAM    carvediloL  25 mg Oral BID     clopidogreL  75 mg Oral Daily    famotidine  20 mg Oral QHS    heparin (porcine)  5,000 Units Subcutaneous Q8H    meropenem IV (PEDS and ADULTS)  500 mg Intravenous Daily    minoxidiL  5 mg Oral BID    mupirocin   Nasal BID    sevelamer carbonate  800 mg Oral TID WM           Physical Exam  Vitals and nursing note reviewed.   Constitutional:       General: He is not in acute distress.     Appearance: He is not diaphoretic.   HENT:      Head: Normocephalic and atraumatic.      Mouth/Throat:      Pharynx: No oropharyngeal exudate.   Eyes:      General: No scleral icterus.     Conjunctiva/sclera: Conjunctivae normal.      Pupils: Pupils are equal, round, and reactive to light.   Cardiovascular:      Rate and Rhythm: Normal rate and regular rhythm.      Heart sounds: Normal heart sounds. No murmur heard.  Pulmonary:      Effort: Pulmonary effort is normal. No respiratory distress.      Breath sounds: Normal breath sounds.   Abdominal:      General: Bowel sounds are normal. There is no distension.      Palpations: Abdomen is soft.      Tenderness: There is no abdominal tenderness.   Musculoskeletal:         General: Normal range of motion.      Cervical back: Normal range of motion and neck supple.      Comments: AVF    Skin:     General: Skin is warm and dry.      Findings: No erythema.   Neurological:      Mental Status: He is alert and oriented to person, place, and time.      Cranial Nerves: No cranial nerve deficit.   Psychiatric:         Mood and Affect: Affect normal.         Cognition and Memory: Memory normal.         Judgment: Judgment normal.         Laboratory:  Recent Labs   Lab 07/04/24  0210 07/05/24  0441 07/06/24  0232   WBC 6.21 7.01 6.05   HGB 10.3* 12.4* 10.6*   HCT 32.3* 39.8* 32.5*    222 227   MONO 11.0  0.7 11.8  0.8 12.9  0.8   EOSINOPHIL 1.8 1.1 2.8     Recent Labs   Lab 07/03/24  0226 07/04/24  0210 07/05/24  0554 07/05/24  1010 07/06/24  0232   *  137 136  --  132* 131*   K  "5.0  4.9 3.8  --  3.9 4.2     102 98  --  96 97   CO2 18*  19* 26  --  26 24   BUN 42*  44* 24*  --  32* 21   CREATININE 7.8*  7.9* 5.2*  --  5.3* 4.7*   CALCIUM 8.9  8.9 8.5*  --  8.6* 8.8   PHOS 5.3* 3.5 4.3  --   --        Diagnostic Results:  X-Ray: Reviewed  US: Reviewed  Echo: Reviewed  ASSESSMENT/PLAN:     1. ESRD on HD via AVF MWF  - here for endocarditis work up with KURTIS on IV abx Vanx/Cefepime   -- tolerated dialysis well yesterday, no issues, next the splint for Monday  -- Will check ID if Abx can be  give on HD MWF    -- Daily Renal Function Panel  -- Avoid Hypotension.  -- Renally dose all meds  -- Please avoid nephrotoxins, including NSAIDs, aminoglycosides, IV contrast (unless absolutely necessary), gadolinium, fleets and other phosphorous-based laxatives. Caution with antibiotics.    2. HTN  - acceptable, on carvedilol 25 b.i.d., amlodipine 10, minoxidil 5 b.i.d.    Temp:  [97.9 °F (36.6 °C)-98.1 °F (36.7 °C)]   Pulse:  [71-74]   Resp:  [17-18]   BP: (109-142)/(55-66)   SpO2:  [94 %-96 %]     3. Anemia of chronic kidney disease treated with JONH   No need for EPogen   Hg at goal >10   Recent Labs   Lab 07/04/24  0210 07/05/24  0441 07/06/24  0232   HGB 10.3* 12.4* 10.6*   HCT 32.3* 39.8* 32.5*    222 227       Iron   Lab Results   Component Value Date    IRON 102 07/01/2024    TIBC 144 (L) 07/01/2024    FERRITIN 2,449.0 (H) 07/01/2024       4. MBD  - phosphorus at goal continue sevelamer 800 with each meal  Recent Labs   Lab 07/05/24  0554 07/05/24  1010 07/06/24  0232   CALCIUM  --    < > 8.8   PHOS 4.3  --   --     < > = values in this interval not displayed.   -- Binders TIDWM    Recent Labs   Lab 07/03/24  0226 07/04/24  0210   MG 2.4  2.4 2.0       Lab Results   Component Value Date     (H) 06/05/2024    CALCIUM 8.8 07/06/2024    PHOS 4.3 07/05/2024     No results found for: "[id number]"  Acidosis   -- correct with HD   Lab Results   Component Value Date    CO2 24 " 07/06/2024       5. Nutrition/Hypoalbuminemia   Recent Labs   Lab 07/03/24  0226 07/04/24  0210   ALBUMIN 2.9*  3.1* 2.8*     Nepro with meals TID. Renal vitamins daily      Thank you for allowing me to participate in care of your patient  With any question please call   Zaynab Peña MD     [facility]  VAL Gonzalez MD,   MD AB Collins MD E. V. Harmon, NP  [address]   [place], [address]  [phone]  After hours answering service: [phone]

## 2024-07-06 NOTE — ASSESSMENT & PLAN NOTE
Bacteremia, unclear source    6/30 blood cultures with with Acinetobacter calcoaceticus-baumannii complex. Staphylococcus species and corynebacterium likely contaminant.  Reviewed sensitivities from outside hospital  Transferred to [place] for concern of mitral valve vegetation.  However KURTIS findings did not show vegetation    Patient remains afebrile and hemodynamically stable    Plan:  -vancomycin/cefepime change to meropenem based on culture results.  Vancomycin has been discontinued  -Repeat blood cultures no growth     Continue daily dosing Merem.  Treatment duration 2 weeks (stop date 7/17)

## 2024-07-06 NOTE — PLAN OF CARE
Pt kept on scratching his body and pulling the tele leads out throughout the night. Wiped body with warm wipes, fixed the leads but did no help. Explained the importance to be on monitor. Pt said ok but he kept removing the tele box. NSR on monitor. No acute events overnight.

## 2024-07-06 NOTE — PLAN OF CARE
Problem: Adult Inpatient Plan of Care  Goal: Plan of Care Review  Outcome: Progressing  Goal: Absence of Hospital-Acquired Illness or Injury  Outcome: Progressing     Problem: Bariatric Environmental Safety  Goal: Safety Maintained with Care  Outcome: Progressing     Problem: Skin Injury Risk Increased  Goal: Skin Health and Integrity  Outcome: Progressing

## 2024-07-06 NOTE — ASSESSMENT & PLAN NOTE
Discussed goals of care with patient and current clinical status  Patient states that he wants to be DNR

## 2024-07-06 NOTE — ASSESSMENT & PLAN NOTE
"- patient presented to OSH with acute volume overload, EF 35% on echo  Nuclear medicine Lexiscan stress test from 7/1/2024 (per chart review), low EF "mild transient ischemia for inferoapical scar but no evidence of Lexiscan induced ischemia at this time".     - volume overload appears improved with dialysis  Currently on 3 L via nasal cannula    Plan:  - Cardiology consulted, appreciate assistance.  Wean off supplemental oxygen as tolerated      "

## 2024-07-06 NOTE — PT/OT/SLP PROGRESS
"Physical Therapy Treatment    Patient Name:  Alexis Aponte   MRN:  [id number]    Recommendations:     Discharge Recommendations: Moderate Intensity Therapy  Discharge Equipment Recommendations: none  Barriers to discharge: Decreased caregiver support    Assessment:     Alexis Aponte is a 68 y.o. male admitted with a medical diagnosis of Bacterial endocarditis.  He presents with the following impairments/functional limitations: weakness, gait instability, impaired endurance, impaired balance, decreased lower extremity function, visual deficits, impaired self care skills, impaired skin, impaired functional mobility     Patient seen for physical therapy treatment on this date.  Patient's daughter brought in patient's prostheses.  Patient donns B prost[facility] at [facility].  Patient performs standing with SBA with RW and prostheses.  Patient then ambulates with RW x ~ 200' with CGA on level surfaces.   Full report to follow.    Rehab Prognosis: Good; patient would benefit from acute skilled PT services to address these deficits and reach maximum level of function.    Recent Surgery: Procedure(s) (LRB):  Transesophageal echo (KURTIS) intra-procedure log documentation (N/A) 3 Days Post-Op    Plan:     During this hospitalization, patient to be seen 5 x/week to address the identified rehab impairments via gait training, therapeutic activities, therapeutic exercises, neuromuscular re-education and progress toward the following goals:    Plan of Care Expires:  08/01/24    Subjective     Chief Complaint: "I need more food"  (Nsg notified)  Patient/Family Comments/goals: to return to PLOF  Pain/Comfort:  Pain Rating 1: 0/10  Pain Rating Post-Intervention 1: 0/10      Objective:     Communicated with nurse Joseph prior to session.  Patient found sitting edge of bed with bed alarm, peripheral IV, telemetry upon PT entry to room.     General Precautions: Standard, fall, vision impaired  Orthopedic Precautions: N/A  Braces: "  (B BKA prostheses)  Respiratory Status: Room air     Functional Mobility:  Transfers:     Sit to Stand:  contact guard assistance with rolling walker and B prostheses  Gait: with RW x ~ 200' with CGA on level surfaces, slow pace, step through pattern  Balance: Seated EOB:  Independent   Standing: requires RW and B prostheses, CGA      AM-PAC 6 CLICK MOBILITY  Turning over in bed (including adjusting bedclothes, sheets and blankets)?: 4  Sitting down on and standing up from a chair with arms (e.g., wheelchair, bedside commode, etc.): 3  Moving from lying on back to sitting on the side of the bed?: 4  Moving to and from a bed to a chair (including a wheelchair)?: 3  Need to walk in hospital room?: 3  Climbing 3-5 steps with a railing?: 1  Basic Mobility Total Score: 18       Treatment & Education:  Patient agreeable to claudy B prostheses at EOB.  Patient donns B prostheses independently.  Patient performs sit to stand with RW with CGA.  Patient performs gait with RW x ~ 200' with CGA, slow pace, step through pattern.   Patient agrees to sit up in bedside chair.  Patient performs B LE exercises with B prostheses donned.  Patient educated on pressure relief to residual limbs and importance of daily observation of residual limbs.     Patient left up in chair with all lines intact, call button in reach, chair alarm on, and nurse notified..    GOALS:   Multidisciplinary Problems       Physical Therapy Goals          Problem: Physical Therapy    Goal Priority Disciplines Outcome Goal Variances Interventions   Physical Therapy Goal     PT, PT/OT Progressing     Description: Goals to be met by: 24     Patient will increase functional independence with mobility by performin.  Bed to/from chair with bilateral prosthesis and RW with Mod Ind  2.  Claudy bilateral prosthesis with Mod Ind: MET 2024  3.  Ambulate 150' with RW with bilateral prosthesis:  MET 2024  Update:  ambulate 250' with RW and B prosthesis at  Mod I  4.  Up in chair 2 hours  5.  Transfer bed to/from w/c with slide board with Mod Ind                         Time Tracking:     PT Received On: 07/06/24  PT Start Time: 1208     PT Stop Time: 1235  PT Total Time (min): 27 min     Billable Minutes: Gait Training 15 and Therapeutic Activity 12    Treatment Type: Treatment  PT/PTA: PT     Number of PTA visits since last PT visit: 0     07/06/2024   Yes

## 2024-07-06 NOTE — PROGRESS NOTES
[facility] Medicine  Progress Note    Patient Name: Alexis Aponte  MRN: [id number]  Patient Class: IP- Inpatient   Admission Date: 7/3/2024  Length of Stay: 3 days  Attending Physician: Bert Moore MD  Primary Care Provider: Richie Cherry MD        Subjective:     Principal Problem:Bacterial endocarditis        HPI:  68 y.o. male with a past medical history of CAD (5 vessel CABG 2005), PVD (bilateral BKA) ESRD (HD MWF, via fistula), history of C. dif who presents as a transfer due to concern for endocarditis. He was admitted to [facility] on 6/30 for shortness of breath. As part of his work-up, found to have new systolic heart failure (EF 30-35%) with possible vegetation on the mitral valve. Admission blood cultures at OSH were positive for Acinetobacter and CONS (per transfer note). Of note, troponin and BNP were elevated on admission. Cardiology consulted at OSH and felt it was due to demand ischemia. A KURTIS was recommended, so he was transferred to [facility]. D-dimer elevated at OSH, CTPE performed without evidence of embolism.     Overall, he feels his symptoms have improved since admission. He initially felt SOB on the day of admission, that has since improved after receiving dialysis at OSH. Remains on 2L of [place] No chest pain, no nausea, no subjective fevers/kills, no diarrhea.        Overview/Hospital Course:  No notes on file    Interval History:     No acute events overnight   Patient no complaints  Denies fever chills nausea or vomiting  Is eager to go home    Review of Systems   Constitutional:  Negative for chills and fever.   Respiratory:  Negative for cough and shortness of breath.    Cardiovascular:  Negative for chest pain and leg swelling.   Gastrointestinal:  Negative for abdominal distention, abdominal pain, diarrhea, nausea and vomiting.   Neurological:  Negative for dizziness.   Psychiatric/Behavioral:  Negative for agitation and confusion.       Objective:     Vital Signs (Most Recent):  Temp: 97.9 °F (36.6 °C) (07/06/24 0743)  Pulse: 74 (07/06/24 0743)  Resp: 18 (07/06/24 0743)  BP: (!) 142/66 (07/06/24 0743)  SpO2: (!) 94 % (07/06/24 0743) Vital Signs (24h Range):  Temp:  [97.6 °F (36.4 °C)-98.6 °F (37 °C)] 97.9 °F (36.6 °C)  Pulse:  [71-81] 74  Resp:  [16-18] 18  SpO2:  [92 %-96 %] 94 %  BP: ()/(55-66) 142/66     Weight: 68.6 kg (151 lb 3.8 oz)  Body mass index is 46.15 kg/m².    Intake/Output Summary (Last 24 hours) at 7/6/2024 0935  Last data filed at 7/5/2024 1328  Gross per 24 hour   Intake 500 ml   Output 2750 ml   Net -2250 ml         Physical Exam  Constitutional:       General: He is not in acute distress.     Appearance: He is ill-appearing. He is not toxic-appearing.   HENT:      Mouth/Throat:      Mouth: Mucous membranes are moist.      Pharynx: Oropharynx is clear.   Eyes:      Conjunctiva/sclera: Conjunctivae normal.   Cardiovascular:      Rate and Rhythm: Normal rate and regular rhythm.   Pulmonary:      Effort: Pulmonary effort is normal. No respiratory distress.      Breath sounds: Normal breath sounds. No wheezing.   Abdominal:      General: Bowel sounds are normal. There is no distension.      Palpations: Abdomen is soft.      Tenderness: There is no abdominal tenderness.   Musculoskeletal:         General: Normal range of motion.      Comments: Status post BKA bilateral   Skin:     General: Skin is warm and dry.   Neurological:      General: No focal deficit present.      Mental Status: He is alert and oriented to person, place, and time.   Psychiatric:         Mood and Affect: Mood normal.         Behavior: Behavior normal.             Significant Labs: All pertinent labs within the past 24 hours have been reviewed.    Significant Imaging: I have reviewed all pertinent imaging results/findings within the past 24 hours.    Assessment/Plan:      * Bacterial endocarditis  Bacteremia, unclear source    6/30 blood cultures with  "with Acinetobacter calcoaceticus-baumannii complex. Staphylococcus species and corynebacterium likely contaminant.  Reviewed sensitivities from outside hospital  Transferred to [place] for concern of mitral valve vegetation.  However KURTIS findings did not show vegetation    Patient remains afebrile and hemodynamically stable    Plan:  -vancomycin/cefepime change to meropenem based on culture results.  Vancomycin has been discontinued  -Repeat blood cultures no growth     Continue daily dosing Merem.  Treatment duration 2 weeks (stop date 7/17)       Bacteremia        Acute hypoxic respiratory failure  Patient with Hypoxic Respiratory failure which is Acute.  he is not on home oxygen. Supplemental oxygen was provided and noted-      .   Signs/symptoms of respiratory failure include- respiratory distress. Contributing diagnoses includes -  volume overload  Labs and images were reviewed. Patient Has not had a recent ABG. Will treat underlying causes and adjust management of respiratory failure as follows- continue dialysis    BKA stump complication  - skin breakdown noted on bilateral stumps, possible diabetic ulceration.   - no obvious signs of infection    Plan:  - wound care consult placed      Acute systolic heart failure  - patient presented to OSH with acute volume overload, EF 35% on echo  Nuclear medicine Lexiscan stress test from 7/1/2024 (per chart review), low EF "mild transient ischemia for inferoapical scar but no evidence of Lexiscan induced ischemia at this time".     - volume overload appears improved with dialysis  Currently on 3 L via nasal cannula    Plan:  - Cardiology consulted, appreciate assistance.  Wean off supplemental oxygen as tolerated        Anemia in chronic kidney disease, on chronic dialysis  - hgb appears stable    ACP (advance care planning)  Discussed goals of care with patient and current clinical status  Patient states that he wants to be DNR    Hyperlipidemia  - Continue " statin      Coronary artery disease  - Known CAD s/p CABG. Had elevated troponins at OSH, evaluated by cardiology. [place] to be Type II NSTEMI. SPECT at OSH reportedly negative    Plan:  - aspirin, statin      Type 2 diabetes mellitus, without long-term current use of insulin  - A1c 5.6 on 7/1  - glucose checks          Essential hypertension  Currently normotensive    Plan:  - continue current regimen  - will defer to renal for overall BP changes  - home regimen included clonidine. Per chart review, he was well controlled on current regimen. Will discontinue for now. Can consider if evidence of rebound hypertension    ESRD (end stage renal disease)  Receives dialysis MWF    Plan  - consult to nephrology for inpatient dialysis management      VTE Risk Mitigation (From admission, onward)           Ordered     heparin (porcine) injection 5,000 Units  Every 8 hours         07/03/24 0109     IP VTE HIGH RISK PATIENT  Once         07/03/24 0109     Place sequential compression device  Until discontinued         07/03/24 0109                    Discharge Planning   JENNIFER: 7/8/2024     Code Status: DNR   Is the patient medically ready for discharge?:     Reason for patient still in hospital (select all that apply): Pending disposition  Discharge Plan A: Long-term acute care facility (LTAC) ([facility])                  Bert Moore MD  Department of Hospital Medicine   [place] [facility]

## 2024-07-06 NOTE — ASSESSMENT & PLAN NOTE
- Known CAD s/p CABG. Had elevated troponins at OSH, evaluated by cardiology. [place] to be Type II NSTEMI. SPECT at OSH reportedly negative    Plan:  - aspirin, statin

## 2024-07-06 NOTE — SUBJECTIVE & OBJECTIVE
Interval History:     No acute events overnight   Patient no complaints  Denies fever chills nausea or vomiting  Is eager to go home    Review of Systems   Constitutional:  Negative for chills and fever.   Respiratory:  Negative for cough and shortness of breath.    Cardiovascular:  Negative for chest pain and leg swelling.   Gastrointestinal:  Negative for abdominal distention, abdominal pain, diarrhea, nausea and vomiting.   Neurological:  Negative for dizziness.   Psychiatric/Behavioral:  Negative for agitation and confusion.      Objective:     Vital Signs (Most Recent):  Temp: 97.9 °F (36.6 °C) (07/06/24 0743)  Pulse: 74 (07/06/24 0743)  Resp: 18 (07/06/24 0743)  BP: (!) 142/66 (07/06/24 0743)  SpO2: (!) 94 % (07/06/24 0743) Vital Signs (24h Range):  Temp:  [97.6 °F (36.4 °C)-98.6 °F (37 °C)] 97.9 °F (36.6 °C)  Pulse:  [71-81] 74  Resp:  [16-18] 18  SpO2:  [92 %-96 %] 94 %  BP: ()/(55-66) 142/66     Weight: 68.6 kg (151 lb 3.8 oz)  Body mass index is 46.15 kg/m².    Intake/Output Summary (Last 24 hours) at 7/6/2024 0935  Last data filed at 7/5/2024 1328  Gross per 24 hour   Intake 500 ml   Output 2750 ml   Net -2250 ml         Physical Exam  Constitutional:       General: He is not in acute distress.     Appearance: He is ill-appearing. He is not toxic-appearing.   HENT:      Mouth/Throat:      Mouth: Mucous membranes are moist.      Pharynx: Oropharynx is clear.   Eyes:      Conjunctiva/sclera: Conjunctivae normal.   Cardiovascular:      Rate and Rhythm: Normal rate and regular rhythm.   Pulmonary:      Effort: Pulmonary effort is normal. No respiratory distress.      Breath sounds: Normal breath sounds. No wheezing.   Abdominal:      General: Bowel sounds are normal. There is no distension.      Palpations: Abdomen is soft.      Tenderness: There is no abdominal tenderness.   Musculoskeletal:         General: Normal range of motion.      Comments: Status post BKA bilateral   Skin:     General: Skin is  warm and dry.   Neurological:      General: No focal deficit present.      Mental Status: He is alert and oriented to person, place, and time.   Psychiatric:         Mood and Affect: Mood normal.         Behavior: Behavior normal.             Significant Labs: All pertinent labs within the past 24 hours have been reviewed.    Significant Imaging: I have reviewed all pertinent imaging results/findings within the past 24 hours.

## 2024-07-07 LAB
ANION GAP SERPL CALC-SCNC: 13 MMOL/L (ref 8–16)
BASOPHILS # BLD AUTO: 0.05 K/UL (ref 0–0.2)
BASOPHILS NFR BLD: 0.6 % (ref 0–1.9)
BUN SERPL-MCNC: 43 MG/DL (ref 8–23)
CALCIUM SERPL-MCNC: 9.1 MG/DL (ref 8.7–10.5)
CHLORIDE SERPL-SCNC: 96 MMOL/L (ref 95–110)
CO2 SERPL-SCNC: 25 MMOL/L (ref 23–29)
CREAT SERPL-MCNC: 7.5 MG/DL (ref 0.5–1.4)
DIFFERENTIAL METHOD BLD: ABNORMAL
EOSINOPHIL # BLD AUTO: 0.3 K/UL (ref 0–0.5)
EOSINOPHIL NFR BLD: 3.1 % (ref 0–8)
ERYTHROCYTE [DISTWIDTH] IN BLOOD BY AUTOMATED COUNT: 18.5 % (ref 11.5–14.5)
EST. GFR  (NO RACE VARIABLE): 7 ML/MIN/1.73 M^2
GLUCOSE SERPL-MCNC: 95 MG/DL (ref 70–110)
HCT VFR BLD AUTO: 32.9 % (ref 40–54)
HGB BLD-MCNC: 10.8 G/DL (ref 14–18)
IMM GRANULOCYTES # BLD AUTO: 0.02 K/UL (ref 0–0.04)
IMM GRANULOCYTES NFR BLD AUTO: 0.3 % (ref 0–0.5)
LYMPHOCYTES # BLD AUTO: 1.5 K/UL (ref 1–4.8)
LYMPHOCYTES NFR BLD: 18.6 % (ref 18–48)
MCH RBC QN AUTO: 30.8 PG (ref 27–31)
MCHC RBC AUTO-ENTMCNC: 32.8 G/DL (ref 32–36)
MCV RBC AUTO: 94 FL (ref 82–98)
MONOCYTES # BLD AUTO: 1.2 K/UL (ref 0.3–1)
MONOCYTES NFR BLD: 14.8 % (ref 4–15)
NEUTROPHILS # BLD AUTO: 5 K/UL (ref 1.8–7.7)
NEUTROPHILS NFR BLD: 62.6 % (ref 38–73)
NRBC BLD-RTO: 0 /100 WBC
PLATELET # BLD AUTO: 235 K/UL (ref 150–450)
PMV BLD AUTO: 9.8 FL (ref 9.2–12.9)
POCT GLUCOSE: 109 MG/DL (ref 70–110)
POCT GLUCOSE: 112 MG/DL (ref 70–110)
POCT GLUCOSE: 144 MG/DL (ref 70–110)
POCT GLUCOSE: 87 MG/DL (ref 70–110)
POTASSIUM SERPL-SCNC: 4.3 MMOL/L (ref 3.5–5.1)
RBC # BLD AUTO: 3.51 M/UL (ref 4.6–6.2)
SODIUM SERPL-SCNC: 134 MMOL/L (ref 136–145)
WBC # BLD AUTO: 7.96 K/UL (ref 3.9–12.7)

## 2024-07-07 PROCEDURE — 25000003 PHARM REV CODE 250: Performed by: HOSPITALIST

## 2024-07-07 PROCEDURE — 85025 COMPLETE CBC W/AUTO DIFF WBC: CPT | Performed by: HOSPITALIST

## 2024-07-07 PROCEDURE — 94761 N-INVAS EAR/PLS OXIMETRY MLT: CPT

## 2024-07-07 PROCEDURE — 63600175 PHARM REV CODE 636 W HCPCS: Performed by: FAMILY MEDICINE

## 2024-07-07 PROCEDURE — 11000001 HC ACUTE MED/SURG PRIVATE ROOM

## 2024-07-07 PROCEDURE — 25000003 PHARM REV CODE 250: Performed by: FAMILY MEDICINE

## 2024-07-07 PROCEDURE — 80048 BASIC METABOLIC PNL TOTAL CA: CPT | Performed by: HOSPITALIST

## 2024-07-07 PROCEDURE — 36415 COLL VENOUS BLD VENIPUNCTURE: CPT | Performed by: HOSPITALIST

## 2024-07-07 PROCEDURE — 25000003 PHARM REV CODE 250: Performed by: STUDENT IN AN ORGANIZED HEALTH CARE EDUCATION/TRAINING PROGRAM

## 2024-07-07 PROCEDURE — 63600175 PHARM REV CODE 636 W HCPCS: Performed by: HOSPITALIST

## 2024-07-07 RX ADMIN — SEVELAMER CARBONATE 800 MG: 800 TABLET, FILM COATED ORAL at 05:07

## 2024-07-07 RX ADMIN — ATORVASTATIN CALCIUM 80 MG: 40 TABLET, FILM COATED ORAL at 06:07

## 2024-07-07 RX ADMIN — MUPIROCIN: 20 OINTMENT TOPICAL at 08:07

## 2024-07-07 RX ADMIN — MINOXIDIL 5 MG: 2.5 TABLET ORAL at 08:07

## 2024-07-07 RX ADMIN — FAMOTIDINE 20 MG: 20 TABLET ORAL at 08:07

## 2024-07-07 RX ADMIN — CARVEDILOL 25 MG: 25 TABLET, FILM COATED ORAL at 08:07

## 2024-07-07 RX ADMIN — SEVELAMER CARBONATE 800 MG: 800 TABLET, FILM COATED ORAL at 08:07

## 2024-07-07 RX ADMIN — HEPARIN SODIUM 5000 UNITS: 5000 INJECTION INTRAVENOUS; SUBCUTANEOUS at 11:07

## 2024-07-07 RX ADMIN — SEVELAMER CARBONATE 800 MG: 800 TABLET, FILM COATED ORAL at 12:07

## 2024-07-07 RX ADMIN — HEPARIN SODIUM 5000 UNITS: 5000 INJECTION INTRAVENOUS; SUBCUTANEOUS at 06:07

## 2024-07-07 RX ADMIN — MEROPENEM 500 MG: 500 INJECTION, POWDER, FOR SOLUTION INTRAVENOUS at 05:07

## 2024-07-07 RX ADMIN — ASPIRIN 81 MG: 81 TABLET, COATED ORAL at 08:07

## 2024-07-07 RX ADMIN — CLOPIDOGREL BISULFATE 75 MG: 75 TABLET ORAL at 08:07

## 2024-07-07 RX ADMIN — AMLODIPINE BESYLATE 10 MG: 5 TABLET ORAL at 08:07

## 2024-07-07 NOTE — PROGRESS NOTES
Nephrology Progress Note       Consult Requested By: Bert Moore MD  Reason for Consult: ESRD      SUBJECTIVE:       ?    Review of Systems   Constitutional:  Positive for malaise/fatigue. Negative for chills and fever.   HENT:  Negative for congestion and sore throat.    Eyes:  Negative for blurred vision, double vision and photophobia.   Respiratory:  Negative for cough and shortness of breath.    Cardiovascular:  Negative for chest pain, palpitations and leg swelling.   Gastrointestinal:  Negative for abdominal pain, diarrhea, nausea and vomiting.   Genitourinary:  Negative for dysuria and urgency.   Musculoskeletal:  Negative for joint pain and myalgias.   Skin:  Negative for itching and rash.   Neurological:  Negative for dizziness, sensory change, weakness and headaches.   Endo/Heme/Allergies:  Negative for polydipsia. Does not bruise/bleed easily.   Psychiatric/Behavioral:  Negative for depression.        Past Medical History:   Diagnosis Date    Anticoagulant long-term use     Carotid stenosis     Coronary artery disease     Diabetes mellitus     diet controlled    Dialysis patient     Disorder of kidney and ureter     ESRD (end stage renal disease)     GERD (gastroesophageal reflux disease)     Heart failure     Hyperlipidemia     Hypertension     PVD (peripheral vascular disease)     Sleep apnea     np cpap          OBJECTIVE:     Vital Signs (Most Recent)  Vitals:    07/07/24 0339 07/07/24 0727 07/07/24 0800 07/07/24 1119   BP:  121/67  117/60   BP Location:  Right arm  Right arm   Patient Position:  Lying  Lying   Pulse: 82 80  75   Resp:  18  18   Temp:  97.6 °F (36.4 °C)  97.3 °F (36.3 °C)   TempSrc:  Oral  Oral   SpO2:  (!) 93% (!) 94% 97%   Weight:       Height:                       Medications:   0.9% NaCl   Intravenous Once    amLODIPine  10 mg Oral Daily    aspirin  81 mg Oral Every other day    atorvastatin  80 mg Oral QAM    carvediloL  25 mg Oral BID    clopidogreL  75 mg Oral Daily     famotidine  20 mg Oral QHS    heparin (porcine)  5,000 Units Subcutaneous Q8H    meropenem IV (PEDS and ADULTS)  500 mg Intravenous Daily    minoxidiL  5 mg Oral BID    mupirocin   Nasal BID    sevelamer carbonate  800 mg Oral TID WM           Physical Exam  Vitals and nursing note reviewed.   Constitutional:       General: He is not in acute distress.     Appearance: He is not diaphoretic.   HENT:      Head: Normocephalic and atraumatic.      Mouth/Throat:      Pharynx: No oropharyngeal exudate.   Eyes:      General: No scleral icterus.     Conjunctiva/sclera: Conjunctivae normal.      Pupils: Pupils are equal, round, and reactive to light.   Cardiovascular:      Rate and Rhythm: Normal rate and regular rhythm.      Heart sounds: Normal heart sounds. No murmur heard.  Pulmonary:      Effort: Pulmonary effort is normal. No respiratory distress.      Breath sounds: Normal breath sounds.   Abdominal:      General: Bowel sounds are normal. There is no distension.      Palpations: Abdomen is soft.      Tenderness: There is no abdominal tenderness.   Musculoskeletal:         General: Normal range of motion.      Cervical back: Normal range of motion and neck supple.      Comments: AVF    Skin:     General: Skin is warm and dry.      Findings: No erythema.   Neurological:      Mental Status: He is alert and oriented to person, place, and time.      Cranial Nerves: No cranial nerve deficit.   Psychiatric:         Mood and Affect: Affect normal.         Cognition and Memory: Memory normal.         Judgment: Judgment normal.         Laboratory:  Recent Labs   Lab 07/05/24  0441 07/06/24  0232 07/07/24  0815   WBC 7.01 6.05 7.96   HGB 12.4* 10.6* 10.8*   HCT 39.8* 32.5* 32.9*    227 235   MONO 11.8  0.8 12.9  0.8 14.8  1.2*   EOSINOPHIL 1.1 2.8 3.1     Recent Labs   Lab 07/03/24  0226 07/04/24  0210 07/05/24  0554 07/05/24  1010 07/06/24  0232 07/07/24  0815   *  137 136  --  132* 131* 134*   K 5.0  4.9 3.8   "--  3.9 4.2 4.3     102 98  --  96 97 96   CO2 18*  19* 26  --  26 24 25   BUN 42*  44* 24*  --  32* 21 43*   CREATININE 7.8*  7.9* 5.2*  --  5.3* 4.7* 7.5*   CALCIUM 8.9  8.9 8.5*  --  8.6* 8.8 9.1   PHOS 5.3* 3.5 4.3  --   --   --        Diagnostic Results:  X-Ray: Reviewed  US: Reviewed  Echo: Reviewed  ASSESSMENT/PLAN:     1. ESRD on HD via AVF MWF  - here for endocarditis work up with KURTIS on IV abx Vanx/Cefepime   -- tolerated dialysis well yesterday, no issues, next the splint for Monday  -- Will check ID if Abx can be  give on HD MWF    -- Daily Renal Function Panel  -- Avoid Hypotension.  -- Renally dose all meds  -- Please avoid nephrotoxins, including NSAIDs, aminoglycosides, IV contrast (unless absolutely necessary), gadolinium, fleets and other phosphorous-based laxatives. Caution with antibiotics.    2. HTN  - acceptable, on carvedilol 25 b.i.d., amlodipine 10, minoxidil 5 b.i.d.    Temp:  [97.3 °F (36.3 °C)-98.5 °F (36.9 °C)]   Pulse:  [75-84]   Resp:  [18-20]   BP: (117-125)/(58-67)   SpO2:  [93 %-97 %]     3. Anemia of chronic kidney disease treated with JONH   No need for EPogen   Hg at goal >10   Recent Labs   Lab 07/05/24  0441 07/06/24  0232 07/07/24  0815   HGB 12.4* 10.6* 10.8*   HCT 39.8* 32.5* 32.9*    227 235       Iron   Lab Results   Component Value Date    IRON 102 07/01/2024    TIBC 144 (L) 07/01/2024    FERRITIN 2,449.0 (H) 07/01/2024       4. MBD  - phosphorus at goal continue sevelamer 800 with each meal  Recent Labs   Lab 07/05/24  0554 07/05/24  1010 07/07/24  0815   CALCIUM  --    < > 9.1   PHOS 4.3  --   --     < > = values in this interval not displayed.   -- Binders TIDWM    Recent Labs   Lab 07/03/24  0226 07/04/24  0210   MG 2.4  2.4 2.0       Lab Results   Component Value Date     (H) 06/05/2024    CALCIUM 9.1 07/07/2024    PHOS 4.3 07/05/2024     No results found for: "[id number]"  Acidosis   -- correct with HD   Lab Results   Component Value Date "    CO2 25 07/07/2024       5. Nutrition/Hypoalbuminemia   Recent Labs   Lab 07/03/24  0226 07/04/24  0210   ALBUMIN 2.9*  3.1* 2.8*     Nepro with meals TID. Renal vitamins daily      Thank you for allowing me to participate in care of your patient  With any question please call   Zaynab Peña MD     [facility]  VAL Gonzalez MD,   MD AB Collins MD E. V. Harmon, NP  [address]   [place], [id number]  [phone]  After hours answering service: [phone]

## 2024-07-07 NOTE — SUBJECTIVE & OBJECTIVE
Interval History:     No acute events overnight   Patient no complaints  Denies fever chills nausea or vomiting  Is eager to go home    Review of Systems   Constitutional:  Negative for chills and fever.   Respiratory:  Negative for cough and shortness of breath.    Cardiovascular:  Negative for chest pain and leg swelling.   Gastrointestinal:  Negative for abdominal distention, abdominal pain, diarrhea, nausea and vomiting.   Neurological:  Negative for dizziness.   Psychiatric/Behavioral:  Negative for agitation and confusion.      Objective:     Vital Signs (Most Recent):  Temp: 97.3 °F (36.3 °C) (07/07/24 1119)  Pulse: 75 (07/07/24 1119)  Resp: 18 (07/07/24 1119)  BP: 117/60 (07/07/24 1119)  SpO2: 97 % (07/07/24 1119) Vital Signs (24h Range):  Temp:  [97.3 °F (36.3 °C)-98.5 °F (36.9 °C)] 97.3 °F (36.3 °C)  Pulse:  [75-94] 75  Resp:  [18-20] 18  SpO2:  [92 %-97 %] 97 %  BP: (114-132)/(57-67) 117/60     Weight: 68.6 kg (151 lb 3.8 oz)  Body mass index is 46.15 kg/m².  No intake or output data in the 24 hours ending 07/07/24 1304        Physical Exam  Constitutional:       General: He is not in acute distress.     Appearance: He is ill-appearing. He is not toxic-appearing.   HENT:      Mouth/Throat:      Mouth: Mucous membranes are moist.      Pharynx: Oropharynx is clear.   Eyes:      Conjunctiva/sclera: Conjunctivae normal.   Cardiovascular:      Rate and Rhythm: Normal rate and regular rhythm.   Pulmonary:      Effort: Pulmonary effort is normal. No respiratory distress.      Breath sounds: Normal breath sounds. No wheezing.   Abdominal:      General: Bowel sounds are normal. There is no distension.      Palpations: Abdomen is soft.      Tenderness: There is no abdominal tenderness.   Musculoskeletal:         General: Normal range of motion.      Comments: Status post BKA bilateral   Skin:     General: Skin is warm and dry.   Neurological:      General: No focal deficit present.      Mental Status: He is alert  and oriented to person, place, and time.   Psychiatric:         Mood and Affect: Mood normal.         Behavior: Behavior normal.             Significant Labs: All pertinent labs within the past 24 hours have been reviewed.    Significant Imaging: I have reviewed all pertinent imaging results/findings within the past 24 hours.

## 2024-07-07 NOTE — PROGRESS NOTES
[facility] Medicine  Progress Note    Patient Name: Alexis Aponte  MRN: [id number]  Patient Class: IP- Inpatient   Admission Date: 7/3/2024  Length of Stay: 4 days  Attending Physician: Bert Moore MD  Primary Care Provider: Richie Cherry MD        Subjective:     Principal Problem:Bacterial endocarditis        HPI:  68 y.o. male with a past medical history of CAD (5 vessel CABG 2005), PVD (bilateral BKA) ESRD (HD MWF, via fistula), history of C. dif who presents as a transfer due to concern for endocarditis. He was admitted to [facility] on 6/30 for shortness of breath. As part of his work-up, found to have new systolic heart failure (EF 30-35%) with possible vegetation on the mitral valve. Admission blood cultures at OSH were positive for Acinetobacter and CONS (per transfer note). Of note, troponin and BNP were elevated on admission. Cardiology consulted at OSH and felt it was due to demand ischemia. A KURTIS was recommended, so he was transferred to [facility]. D-dimer elevated at OSH, CTPE performed without evidence of embolism.     Overall, he feels his symptoms have improved since admission. He initially felt SOB on the day of admission, that has since improved after receiving dialysis at OSH. Remains on 2L of [place] No chest pain, no nausea, no subjective fevers/kills, no diarrhea.        Overview/Hospital Course:  No notes on file    Interval History:     No acute events overnight   Patient no complaints  Denies fever chills nausea or vomiting  Is eager to go home    Review of Systems   Constitutional:  Negative for chills and fever.   Respiratory:  Negative for cough and shortness of breath.    Cardiovascular:  Negative for chest pain and leg swelling.   Gastrointestinal:  Negative for abdominal distention, abdominal pain, diarrhea, nausea and vomiting.   Neurological:  Negative for dizziness.   Psychiatric/Behavioral:  Negative for agitation and confusion.       Objective:     Vital Signs (Most Recent):  Temp: 97.3 °F (36.3 °C) (07/07/24 1119)  Pulse: 75 (07/07/24 1119)  Resp: 18 (07/07/24 1119)  BP: 117/60 (07/07/24 1119)  SpO2: 97 % (07/07/24 1119) Vital Signs (24h Range):  Temp:  [97.3 °F (36.3 °C)-98.5 °F (36.9 °C)] 97.3 °F (36.3 °C)  Pulse:  [75-94] 75  Resp:  [18-20] 18  SpO2:  [92 %-97 %] 97 %  BP: (114-132)/(57-67) 117/60     Weight: 68.6 kg (151 lb 3.8 oz)  Body mass index is 46.15 kg/m².  No intake or output data in the 24 hours ending 07/07/24 1304        Physical Exam  Constitutional:       General: He is not in acute distress.     Appearance: He is ill-appearing. He is not toxic-appearing.   HENT:      Mouth/Throat:      Mouth: Mucous membranes are moist.      Pharynx: Oropharynx is clear.   Eyes:      Conjunctiva/sclera: Conjunctivae normal.   Cardiovascular:      Rate and Rhythm: Normal rate and regular rhythm.   Pulmonary:      Effort: Pulmonary effort is normal. No respiratory distress.      Breath sounds: Normal breath sounds. No wheezing.   Abdominal:      General: Bowel sounds are normal. There is no distension.      Palpations: Abdomen is soft.      Tenderness: There is no abdominal tenderness.   Musculoskeletal:         General: Normal range of motion.      Comments: Status post BKA bilateral   Skin:     General: Skin is warm and dry.   Neurological:      General: No focal deficit present.      Mental Status: He is alert and oriented to person, place, and time.   Psychiatric:         Mood and Affect: Mood normal.         Behavior: Behavior normal.             Significant Labs: All pertinent labs within the past 24 hours have been reviewed.    Significant Imaging: I have reviewed all pertinent imaging results/findings within the past 24 hours.    Assessment/Plan:      * Bacterial endocarditis  Bacteremia, unclear source    6/30 blood cultures with with Acinetobacter calcoaceticus-baumannii complex. Staphylococcus species and corynebacterium likely  "contaminant.  Reviewed sensitivities from outside hospital  Transferred to [facility] for concern of mitral valve vegetation.  However KURTIS findings did not show vegetation    Patient remains afebrile and hemodynamically stable    Plan:  -vancomycin/cefepime change to meropenem based on culture results.  Vancomycin has been discontinued  -Repeat blood cultures no growth     Continue daily dosing Merem.  Treatment duration 2 weeks (stop date 7/17)       Bacteremia        Acute hypoxic respiratory failure  Patient with Hypoxic Respiratory failure which is Acute.  he is not on home oxygen. Supplemental oxygen was provided and noted-      .   Signs/symptoms of respiratory failure include- respiratory distress. Contributing diagnoses includes -  volume overload  Labs and images were reviewed. Patient Has not had a recent ABG. Will treat underlying causes and adjust management of respiratory failure as follows- continue dialysis    BKA stump complication  - skin breakdown noted on bilateral stumps, possible diabetic ulceration.   - no obvious signs of infection    Plan:  - wound care consult placed      Acute systolic heart failure  - patient presented to OSH with acute volume overload, EF 35% on echo  Nuclear medicine Lexiscan stress test from 7/1/2024 (per chart review), low EF "mild transient ischemia for inferoapical scar but no evidence of Lexiscan induced ischemia at this time".     - volume overload appears improved with dialysis  Patient was weaned off supplemental oxygen in his currently doing well on room air    Plan:  - Cardiology consulted, appreciate assistance.  Continue dialysis per Nephrology        Anemia in chronic kidney disease, on chronic dialysis  - hgb appears stable    ACP (advance care planning)  Discussed goals of care with patient and current clinical status  Patient states that he wants to be DNR    Hyperlipidemia  - Continue statin      Coronary artery disease  - Known CAD s/p CABG. Had elevated " troponins at OSH, evaluated by cardiology. [place] to be Type II NSTEMI. SPECT at OSH reportedly negative    Plan:  - aspirin, statin      Type 2 diabetes mellitus, without long-term current use of insulin  - A1c 5.6 on 7/1  - glucose checks          Essential hypertension  Currently normotensive    Plan:  - continue current regimen  - will defer to renal for overall BP changes  - home regimen included clonidine. Per chart review, he was well controlled on current regimen. Will discontinue for now. Can consider if evidence of rebound hypertension    ESRD (end stage renal disease)  Receives dialysis MWF    Plan  - consult to nephrology for inpatient dialysis management      VTE Risk Mitigation (From admission, onward)           Ordered     heparin (porcine) injection 5,000 Units  Every 8 hours         07/03/24 0109     IP VTE HIGH RISK PATIENT  Once         07/03/24 0109     Place sequential compression device  Until discontinued         07/03/24 0109                    Discharge Planning   JENNIFER: 7/8/2024     Code Status: DNR   Is the patient medically ready for discharge?:     Reason for patient still in hospital (select all that apply): Pending disposition  Discharge Plan A: Long-term acute care facility (LTAC) ([facility])                  Bert Moore MD  Department of Hospital Medicine   [place] [facility]

## 2024-07-07 NOTE — ASSESSMENT & PLAN NOTE
"- patient presented to OSH with acute volume overload, EF 35% on echo  Nuclear medicine Lexiscan stress test from 7/1/2024 (per chart review), low EF "mild transient ischemia for inferoapical scar but no evidence of Lexiscan induced ischemia at this time".     - volume overload appears improved with dialysis  Patient was weaned off supplemental oxygen in his currently doing well on room air    Plan:  - Cardiology consulted, appreciate assistance.  Continue dialysis per Nephrology      "

## 2024-07-07 NOTE — ASSESSMENT & PLAN NOTE
Bacteremia, unclear source    6/30 blood cultures with with Acinetobacter calcoaceticus-baumannii complex. Staphylococcus species and corynebacterium likely contaminant.  Reviewed sensitivities from outside hospital  Transferred to [place] for concern of mitral valve vegetation.  However KURTIS findings did not show vegetation    Patient remains afebrile and hemodynamically stable    Plan:  -vancomycin/cefepime change to meropenem based on culture results.  Vancomycin has been discontinued  -Repeat blood cultures no growth     Continue daily dosing Merem.  Treatment duration 2 weeks (stop date 7/17)

## 2024-07-07 NOTE — ASSESSMENT & PLAN NOTE
- Known CAD s/p CABG. Had elevated troponins at OSH, evaluated by cardiology. [place] to be Type II NSTEMI. SPECT at OSH reportedly negative    Plan:  - aspirin, statin

## 2024-07-08 VITALS
SYSTOLIC BLOOD PRESSURE: 138 MMHG | HEIGHT: 60 IN | DIASTOLIC BLOOD PRESSURE: 64 MMHG | TEMPERATURE: 97 F | WEIGHT: 151.25 LBS | BODY MASS INDEX: 29.7 KG/M2 | HEART RATE: 86 BPM | RESPIRATION RATE: 16 BRPM | OXYGEN SATURATION: 95 %

## 2024-07-08 LAB
ANION GAP SERPL CALC-SCNC: 13 MMOL/L (ref 8–16)
BACTERIA BLD CULT: NORMAL
BACTERIA BLD CULT: NORMAL
BASOPHILS # BLD AUTO: 0.05 K/UL (ref 0–0.2)
BASOPHILS NFR BLD: 0.6 % (ref 0–1.9)
BUN SERPL-MCNC: 63 MG/DL (ref 8–23)
CALCIUM SERPL-MCNC: 9.1 MG/DL (ref 8.7–10.5)
CHLORIDE SERPL-SCNC: 95 MMOL/L (ref 95–110)
CO2 SERPL-SCNC: 23 MMOL/L (ref 23–29)
CREAT SERPL-MCNC: 9.2 MG/DL (ref 0.5–1.4)
DIFFERENTIAL METHOD BLD: ABNORMAL
EOSINOPHIL # BLD AUTO: 0.3 K/UL (ref 0–0.5)
EOSINOPHIL NFR BLD: 3.2 % (ref 0–8)
ERYTHROCYTE [DISTWIDTH] IN BLOOD BY AUTOMATED COUNT: 18.2 % (ref 11.5–14.5)
EST. GFR  (NO RACE VARIABLE): 6 ML/MIN/1.73 M^2
GLUCOSE SERPL-MCNC: 71 MG/DL (ref 70–110)
HCT VFR BLD AUTO: 31.5 % (ref 40–54)
HGB BLD-MCNC: 10.4 G/DL (ref 14–18)
IMM GRANULOCYTES # BLD AUTO: 0.03 K/UL (ref 0–0.04)
IMM GRANULOCYTES NFR BLD AUTO: 0.4 % (ref 0–0.5)
LYMPHOCYTES # BLD AUTO: 1.6 K/UL (ref 1–4.8)
LYMPHOCYTES NFR BLD: 19.8 % (ref 18–48)
MCH RBC QN AUTO: 30.6 PG (ref 27–31)
MCHC RBC AUTO-ENTMCNC: 33 G/DL (ref 32–36)
MCV RBC AUTO: 93 FL (ref 82–98)
MONOCYTES # BLD AUTO: 1.2 K/UL (ref 0.3–1)
MONOCYTES NFR BLD: 14.2 % (ref 4–15)
NEUTROPHILS # BLD AUTO: 5 K/UL (ref 1.8–7.7)
NEUTROPHILS NFR BLD: 61.8 % (ref 38–73)
NRBC BLD-RTO: 0 /100 WBC
PLATELET # BLD AUTO: 245 K/UL (ref 150–450)
PMV BLD AUTO: 10.4 FL (ref 9.2–12.9)
POCT GLUCOSE: 106 MG/DL (ref 70–110)
POTASSIUM SERPL-SCNC: 5.3 MMOL/L (ref 3.5–5.1)
RBC # BLD AUTO: 3.4 M/UL (ref 4.6–6.2)
SODIUM SERPL-SCNC: 131 MMOL/L (ref 136–145)
WBC # BLD AUTO: 8.09 K/UL (ref 3.9–12.7)

## 2024-07-08 PROCEDURE — 25000003 PHARM REV CODE 250: Performed by: STUDENT IN AN ORGANIZED HEALTH CARE EDUCATION/TRAINING PROGRAM

## 2024-07-08 PROCEDURE — 36415 COLL VENOUS BLD VENIPUNCTURE: CPT | Performed by: HOSPITALIST

## 2024-07-08 PROCEDURE — 94761 N-INVAS EAR/PLS OXIMETRY MLT: CPT

## 2024-07-08 PROCEDURE — 25000003 PHARM REV CODE 250: Performed by: FAMILY MEDICINE

## 2024-07-08 PROCEDURE — 85025 COMPLETE CBC W/AUTO DIFF WBC: CPT | Performed by: HOSPITALIST

## 2024-07-08 PROCEDURE — 80100016 HC MAINTENANCE HEMODIALYSIS

## 2024-07-08 PROCEDURE — 80048 BASIC METABOLIC PNL TOTAL CA: CPT | Performed by: HOSPITALIST

## 2024-07-08 RX ORDER — AMLODIPINE BESYLATE 10 MG/1
10 TABLET ORAL DAILY
Qty: 90 TABLET | Refills: 3 | Status: SHIPPED | OUTPATIENT
Start: 2024-07-08 | End: 2025-07-08

## 2024-07-08 RX ADMIN — SEVELAMER CARBONATE 800 MG: 800 TABLET, FILM COATED ORAL at 04:07

## 2024-07-08 RX ADMIN — AMLODIPINE BESYLATE 10 MG: 5 TABLET ORAL at 02:07

## 2024-07-08 RX ADMIN — ATORVASTATIN CALCIUM 80 MG: 40 TABLET, FILM COATED ORAL at 06:07

## 2024-07-08 RX ADMIN — CLOPIDOGREL BISULFATE 75 MG: 75 TABLET ORAL at 02:07

## 2024-07-08 NOTE — PT/OT/SLP PROGRESS
Physical Therapy      Patient Name:  Alexis Aponte   MRN:  [id number]    1109 -Patient not seen today secondary to Other (Comment) off unit to dialysis (850).  RN reports pt discharging after dialysis.

## 2024-07-08 NOTE — PLAN OF CARE
"0750  Per ID note 7/5/2024, pt will dc with meropenem x2 weeks (end 7/17/2024). CM was informed by Lindsay ([phone]) w/[facility] that they do not supply meropenem. Referral sent [facility] via [facility] requesting that they provide the IV abx for [place]. Awaiting response.     0805  NIKI informed the pt's discharge, Kartik Aponte ([phone]), via phone of above. Vineet verbalized understanding.        07/08/24 0830   Rounds   Attendance Provider;Nurse    Discharge Plan A Home with family     0830  NIKI was informed by Dr Moore that the pt is medically stable to discharge home today w/IV abx. CM informed MD of above, questioned if pt would receive his HD session prior to dc, & requested HH orders. Awaiting response.     0850  CM was informed by nurse Armendariz that the pt is being transported for HD now.     0925  CM informed Kartik of the pt's discharge status & that the pt is currently receiving his HD treatment. Kartik verbalized understanding & agreement and stated that either she or her adult daughter, Nomei, will provide transportation for the pt after 1400 today.     1035  CM rounded on the pt in the dialysis unit.  No family present. Patient in agreement with plan to discharge home today with IV abx to be provided by [facility] & administered by Twila, denied the need for assistance with transportation at time of discharge, & verbalized understanding regarding the hospital follow up appointment with Dr Cherry (PCP) on 7/11/2024 at 1420. Signed "Pt Choice" form placed in the pt's chart.     CM informed Starr torres/GEMMA GUERRA of change in pt's discharge plan.     1140  Still awaiting HH orders. Message sent to pharmacist Yadira requesting assistance. Awaiting response.     1225  Voicemail message left for Romana torres/[facility] informing of HH orders sent via [facility]. Awaiting response.     1435  CM was informed by nurse Armendariz that the pt has " returned from . Message sent to Romana questioning IV abx status. Awaiting response.     1450  CM was informed by Romana that the pt has been accepted & that the IV meropenem will be delivered to [facility]. Information added to the pt's discharge paperwork.     Messages sent to nurse Kala & virtual nurse Izabela informing that the pt is cleared to discharge.       Will continue to follow.

## 2024-07-08 NOTE — PLAN OF CARE
Problem: Adult Inpatient Plan of Care  Goal: Plan of Care Review  Outcome: Progressing  Goal: Absence of Hospital-Acquired Illness or Injury  Outcome: Progressing     Problem: Bariatric Environmental Safety  Goal: Safety Maintained with Care  Outcome: Progressing     Problem: Infection  Goal: Absence of Infection Signs and Symptoms  Outcome: Progressing     Problem: Skin Injury Risk Increased  Goal: Skin Health and Integrity  Outcome: Progressing

## 2024-07-08 NOTE — PROGRESS NOTES
Discharge orders noted. Additional clinical references attached. Patient's discharge instructions given by bedside RN and reviewed via this VN.  Education provided on new and previous medications, diagnosis, and follow-up appointments.  New medications delivered by pharmacy. Patient verbalized understanding and teach back method was used. Patient's ride/transportation home at bedside. All questions answered. Transport to [facility] will be requested once daughter arrives. Floor nurse notified.                07/08/24 6616    Notification    Notified Of Discharge Status   Admission   Communication Issues? None   Shift   Virtual Nurse - Rounding Complete   Virtual Nurse - Patient Verbalized Approval Of Camera Use   Safety/Activity   Patient Rounds bed in low position;call light in patient/parent reach;clutter free environment maintained;visualized patient   Safety Promotion/Fall Prevention side rails raised x 2   Activity Management Sitting at edge of bed - L2

## 2024-07-08 NOTE — PT/OT/SLP PROGRESS
Occupational Therapy      Patient Name:  Alexis Aponte   MRN:  [id number]    Patient not seen today secondary to AM: Dialysis.  PM: Pt with planned discharge home today after dialysis.      7/8/2024

## 2024-07-08 NOTE — PLAN OF CARE
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]  Yes    Acute hypoxic respiratory failure [J96.01]  Yes     - present on admission  - 2/2 volume overload  - repeat chest xray  - stable on 2L of oxygen      Anemia in chronic kidney disease, on chronic dialysis [N18.6, D63.1, Z99.2]  Not Applicable    ACP (advance care planning) [Z71.89]  Not Applicable    Hyperlipidemia [E78.5]  Yes    Coronary artery disease [I25.10]  Yes    ESRD (end stage renal disease) [N18.6]  Yes    Essential hypertension [I10]  Yes    Type 2 diabetes mellitus, without long-term current use of insulin [E11.9]  Yes      Resolved Hospital Problems   No resolved problems to display.       Follow Up Appointments:  No future appointments.    Allergies:Review of patient's allergies indicates:  No Known Allergies    Medications: Review discharge medications with patient and family and provide education.    Administer (drug and dose):   IV Meropenem 1g on Mondays and Wednesdays with dialysis sessions.   IV meropenem 2g on Fridays with dialysis sessions.   End date 7/17/2024    Current Facility-Administered Medications   Medication Dose Route Frequency Provider Last Rate Last Admin    0.9%  NaCl infusion   Intravenous PRN Angela Quinn MD        0.9%  NaCl infusion   Intravenous Once Angela Quinn MD        acetaminophen tablet 650 mg  650 mg Oral Q4H PRN Tonny Velásquez MD        amLODIPine tablet 10 mg  10 mg Oral Daily Ethan Porter MD   10 mg at 07/07/24 0806    aspirin  EC tablet 81 mg  81 mg Oral Every other day Tonny Velásquez MD   81 mg at 07/07/24 0806    atorvastatin tablet 80 mg  80 mg Oral QAM Tonny Velásquez MD   80 mg at 07/08/24 0646    carvediloL tablet 25 mg  25 mg Oral BID Tonny Velásquez MD   25 mg at 07/07/24 2025    clopidogreL tablet 75 mg  75 mg Oral Daily Tonny Velásquez MD   75 mg at 07/07/24 0806    dextrose 10% bolus 125 mL 125 mL  12.5 g Intravenous PRN Tonny Velásquez MD        dextrose 10% bolus 250 mL 250 mL  25 g Intravenous PRN Tonny Velásquez MD        famotidine tablet 20 mg  20 mg Oral QHS Bert Moore MD   20 mg at 07/07/24 2025    glucagon (human recombinant) injection 1 mg  1 mg Intramuscular PRN Tonny Velásquez MD        glucose chewable tablet 16 g  16 g Oral PRN Tonny Velásquez MD        glucose chewable tablet 24 g  24 g Oral PRN Tonny Velásquez MD        heparin (porcine) injection 5,000 Units  5,000 Units Subcutaneous Q8H Tonny Velásquez MD   5,000 Units at 07/07/24 2351    melatonin tablet 6 mg  6 mg Oral Nightly PRN Raciel Jacobsen, NP   6 mg at 07/05/24 2223    meropenem (MERREM) 500 mg in 0.9% NaCl 100 mL IVPB (MB+)  500 mg Intravenous Daily Bert Moore MD   Stopped at 07/07/24 1849    minoxidiL tablet 5 mg  5 mg Oral BID Tonny Velásquez MD   5 mg at 07/07/24 2025    naloxone 0.4 mg/mL injection 0.02 mg  0.02 mg Intravenous PRN Tonny Velásquez MD        sevelamer carbonate tablet 800 mg  800 mg Oral TID WM Tonny Velásquez MD   800 mg at 07/07/24 1702    sodium chloride 0.9% bolus 250 mL 250 mL  250 mL Intravenous PRN Angela Quinn MD        sodium chloride 0.9% flush 10 mL  10 mL Intravenous Q12H PRN Tonny Velásquez MD         Current Discharge Medication List        START taking these medications    Details   amLODIPine (NORVASC) 10 MG tablet Take 1 tablet (10 mg total) by mouth once daily.  Qty: 90 tablet, Refills: 3    Comments: .           CONTINUE these medications which have NOT CHANGED    Details    acetaminophen (TYLENOL) 325 MG tablet Take 1 tablet (325 mg total) by mouth every 6 (six) hours as needed for Pain.      aspirin (ECOTRIN) 81 MG EC tablet Take 1 tablet (81 mg total) by mouth every other day.  Qty: 30 tablet, Refills: 0      atorvastatin (LIPITOR) 80 MG tablet Take 1 tablet (80 mg total) by mouth every morning.  Qty: 30 tablet, Refills: 0      calcitRIOL (ROCALTROL) 0.5 MCG Cap Take 1 capsule (0.5 mcg total) by mouth once daily.      carvediloL (COREG) 25 MG tablet Take 1 tablet (25 mg total) by mouth 2 (two) times daily.  Qty: 60 tablet, Refills: 0    Comments: .      clopidogreL (PLAVIX) 75 mg tablet Take 1 tablet (75 mg total) by mouth once daily.      famotidine (PEPCID) 40 MG tablet Take 1 tablet (40 mg total) by mouth every evening.      gabapentin (NEURONTIN) 300 MG capsule Take 1 capsule (300 mg total) by mouth every evening.  Qty: 90 capsule, Refills: 3      minoxidiL (LONITEN) 2.5 MG tablet Take 2 tablets (5 mg total) by mouth 2 (two) times daily.    Comments: .      sevelamer carbonate (RENVELA) 800 mg Tab Take 1 tablet (800 mg total) by mouth 3 (three) times daily with meals.      polyethylene glycol (GLYCOLAX) 17 gram PwPk Take 17 g by mouth once daily.               I have seen and examined this patient within the last 30 days. My clinical findings that support the need for the home health skilled services and home bound status are the following:no   Weakness/numbness causing balance and gait disturbance due to Infection and Weakness/Debility making it taxing to leave home.     Diet:   renal diet    Labs:  Report Lab results to PCP.    Referrals/ Consults  Physical Therapy to evaluate and treat. Evaluate for home safety and equipment needs; Establish/upgrade home exercise program. Perform / instruct on therapeutic exercises, gait training, transfer training, and Range of Motion.  Occupational Therapy to evaluate and treat. Evaluate home environment for safety and equipment needs.  Perform/Instruct on transfers, ADL training, ROM, and therapeutic exercises.   to evaluate for community resources/long-range planning.  Aide to provide assistance with personal care, ADLs, and vital signs.    Activities:   activity as tolerated    Nursing:   Agency to admit patient within 24 hours of hospital discharge unless specified on physician order or at patient request    SN to complete comprehensive assessment including routine vital signs. Instruct on disease process and s/s of complications to report to MD. Review/verify medication list sent home with the patient at time of discharge  and instruct patient/caregiver as needed. Frequency may be adjusted depending on start of care date.     Skilled nurse to perform up to 3 visits PRN for symptoms related to diagnosis    Notify MD if SBP > 160 or < 90; DBP > 90 or < 50; HR > 120 or < 50; Temp > 101; O2 < 88%; Other:       Ok to schedule additional visits based on staff availability and patient request on consecutive days within the home health episode.    When multiple disciplines ordered:    Start of Care occurs on Sunday - Wednesday schedule remaining discipline evaluations as ordered on separate consecutive days following the start of care.    Thursday SOC -schedule subsequent evaluations Friday and Monday the following week.     Friday - Saturday SOC - schedule subsequent discipline evaluations on consecutive days starting Monday of the following week.    For all post-discharge communication and subsequent orders please contact patient's primary care physician.     Miscellaneous   Routine Skin for Bedridden Patients: Instruct patient/caregiver to apply moisture barrier cream to all skin folds and wet areas in perineal area daily and after baths and all bowel movements.  Home Infusion Therapy:   SN to perform Infusion Therapy/Central Line Care.  Review Central Line Care & Central Line Flush with patient.    Administer (drug and dose):   IV  Meropenem 1g on Mondays and Wednesdays with dialysis sessions.   IV meropenem 2g on Fridays with dialysis sessions.   End date 7/17/2024      Home Health Aide:  Nursing , Physical Therapy , Occupational Therapy , Medical Social Work , and Home Health Aide     Wound Care Orders   continue withroutine skin care and keep stump wounds dry and open to air.     I certify that this patient is confined to his home and needs intermittent skilled nursing care, physical therapy, and occupational therapy.

## 2024-07-08 NOTE — PLAN OF CARE
Problem: Adult Inpatient Plan of Care  Goal: Plan of Care Review  2024 1529 by Izabela Crouch, RN  Outcome: Met  2024 0744 by Izabela Crouch RN  Outcome: Progressing  Goal: Patient-Specific Goal (Individualized)  Outcome: Met  Goal: Absence of Hospital-Acquired Illness or Injury  Outcome: Met  Goal: Optimal Comfort and Wellbeing  Outcome: Met  Goal: Readiness for Transition of Care  Outcome: Met     Problem: Diabetes Comorbidity  Goal: Blood Glucose Level Within Targeted Range  Outcome: Met     Problem: Bariatric Environmental Safety  Goal: Safety Maintained with Care  Outcome: Met     Problem: Infection  Goal: Absence of Infection Signs and Symptoms  Outcome: Met     Problem: Skin Injury Risk Increased  Goal: Skin Health and Integrity  Outcome: Met     Problem: Physical Therapy  Goal: Physical Therapy Goal  Description: Goals to be met by: 24     Patient will increase functional independence with mobility by performin.  Bed to/from chair with bilateral prosthesis and RW with Mod Ind  2.  Claudy bilateral prosthesis with Mod Ind: MET 2024  3.  Ambulate 150' with RW with bilateral prosthesis:  MET 2024  Update:  ambulate 250' with RW and B prosthesis at Mod I  4.  Up in chair 2 hours  5.  Transfer bed to/from w/c with slide board with Mod Ind    Outcome: Met     Problem: Hemodialysis  Goal: Safe, Effective Therapy Delivery  Outcome: Met  Goal: Effective Tissue Perfusion  Outcome: Met  Goal: Absence of Infection Signs and Symptoms  Outcome: Met     Problem: Chronic Kidney Disease  Goal: Optimal Coping with Chronic Illness  Outcome: Met  Goal: Electrolyte Balance  Outcome: Met  Goal: Fluid Balance  Outcome: Met  Goal: Optimal Functional Ability  Outcome: Met  Goal: Absence of Anemia Signs and Symptoms  Outcome: Met  Goal: Optimal Oral Intake  Outcome: Met  Goal: Acceptable Pain Control  Outcome: Met  Goal: Minimize Renal Failure Effects  Outcome: Met     Problem: Heart Failure  Goal: Optimal  Coping  Outcome: Met  Goal: Optimal Cardiac Output  Outcome: Met  Goal: Stable Heart Rate and Rhythm  Outcome: Met  Goal: Optimal Functional Ability  Outcome: Met  Goal: Fluid and Electrolyte Balance  Outcome: Met  Goal: Improved Oral Intake  Outcome: Met  Goal: Effective Oxygenation and Ventilation  Outcome: Met  Goal: Effective Breathing Pattern During Sleep  Outcome: Met     Problem: Fall Injury Risk  Goal: Absence of Fall and Fall-Related Injury  Outcome: Met     Problem: Comorbidity Management  Goal: Blood Pressure in Desired Range  Outcome: Met     Problem: Occupational Therapy  Goal: Occupational Therapy Goal  Description: Goals to be met by: D/C     Patient will increase functional independence with ADLs by performing:    LE Dressing with Minimal Assistance.  Grooming while seated with Set-up Assistance.  Toileting from bedside commode with Stand-by Assistance for hygiene and clothing management.   Toilet transfer to bedside commode with prosthesis with Stand-by Assistance.  Upper extremity exercise program 2x10 reps per handout, with independence.    Outcome: Met     Problem: Gas Exchange Impaired  Goal: Optimal Gas Exchange  Outcome: Met

## 2024-07-09 NOTE — PLAN OF CARE
[facility] - Telemetry  Discharge Final Note    Primary Care Provider: Richie Cherry MD    Expected Discharge Date: 7/8/2024    Final Discharge Note (most recent)       Final Note - 07/09/24 1455          Final Note    Assessment Type Final Discharge Note (P)      Anticipated Discharge Disposition Home-Health Care Svc (P)    IV abx to be administered at [facility] & provided by [facility] Resources/Appts/Education Provided Appointments scheduled and added to AVS (P)         Post-Acute Status    Post-Acute Authorization IV Infusion (P)    [facility]                    Contact Info       Richie Cherry MD   Specialty: General Surgery   Relationship: PCP - General    [address] [place] [id number]   Phone: [phone]       Next Steps: Follow up on 7/11/2024    Instructions: at 2:20pm; PCP hospital follow up appointment    [facility]    [address] [place] [id number]       Next Steps: Follow up    Instructions: will provide IV meropenem for [facility]          Pt's face sheet & HH orders manually faxed to nurse Montoya (f [phone]) w/[facility].

## 2024-07-09 NOTE — PROGRESS NOTES
[facility] nurse educator did not have to meet with this patient for education as medication will be delivered and given by HD. Medication orders are for merrem 1 gram on M and W with HD and Merrem 2 grams on Fridays with HD. Stage 2 completed. Patient does not need HH. Discussed the plan with patients NIKI Lopez.     Romana Ray, RN, BSN  Clinical Liaison   [facility]  Cell [phone]  Available M-F 8:30-5pm  Office [phone]  Available 24/7

## 2024-07-16 NOTE — DISCHARGE SUMMARY
[facility] Medicine  Discharge Summary      Patient Name: Alexis Aponte  MRN: [id number]  HAZEL: [id number]  Patient Class: IP- Inpatient  Admission Date: 7/3/2024  Hospital Length of Stay: 5 days  Discharge Date and Time: 7/8/2024  5:28 PM  Attending Physician: No att. providers found   Discharging Provider: Bert Moore MD  Primary Care Provider: Richie Cherry MD    Primary Care Team: Networked reference to record PCT     HPI:   68 y.o. male with a past medical history of CAD (5 vessel CABG 2005), PVD (bilateral BKA) ESRD (HD MWF, via fistula), history of C. dif who presents as a transfer due to concern for endocarditis. He was admitted to [facility] on 6/30 for shortness of breath. As part of his work-up, found to have new systolic heart failure (EF 30-35%) with possible vegetation on the mitral valve. Admission blood cultures at OSH were positive for Acinetobacter and CONS (per transfer note). Of note, troponin and BNP were elevated on admission. Cardiology consulted at OSH and felt it was due to demand ischemia. A KURTIS was recommended, so he was transferred to [facility]. D-dimer elevated at OSH, CTPE performed without evidence of embolism.     Overall, he feels his symptoms have improved since admission. He initially felt SOB on the day of admission, that has since improved after receiving dialysis at OSH. Remains on 2L of [place] No chest pain, no nausea, no subjective fevers/kills, no diarrhea.        Procedure(s) (LRB):  Transesophageal echo (KURTIS) intra-procedure log documentation (N/A)      Hospital Course:   No notes on file     Goals of Care Treatment Preferences:  Code Status: DNR      Consults:   Consults (From admission, onward)          Status Ordering Provider     Inpatient consult to [facility] (Lisa Mcclain Nimkevych)  Once        Provider:  (Not yet assigned)    JAQUELINE Mckinney     Infectious Diseases  Once        Provider:  (Not  "yet assigned)    Completed CHARLENE PEREYRA     [facility]iology[facility]  Once        Provider:  (Not yet assigned)    Completed CHARLENE PEREYRA            Pulmonary  Acute hypoxic respiratory failure  Patient with Hypoxic Respiratory failure which is Acute.  he is not on home oxygen. Supplemental oxygen was provided and noted-      .   Signs/symptoms of respiratory failure include- respiratory distress. Contributing diagnoses includes -  volume overload  Labs and images were reviewed. Patient Has not had a recent ABG. Will treat underlying causes and adjust management of respiratory failure as follows- continue dialysis    Cardiac/Vascular  * Bacterial endocarditis  Bacteremia, unclear source    6/30 blood cultures with with Acinetobacter calcoaceticus-baumannii complex. Staphylococcus species and corynebacterium likely contaminant.  Reviewed sensitivities from outside hospital  Transferred to [place] for concern of mitral valve vegetation.  However KURTIS findings did not show vegetation    Patient remains afebrile and hemodynamically stable    Plan:  -vancomycin/cefepime change to meropenem based on culture results.  Vancomycin has been discontinued  -Repeat blood cultures no growth     Patient to continue meropenem per ID recommendations. Treatment duration 2 weeks (stop date 7/17)   At discharge patient will continue course of Merem, dosing with dialysis sessions.       Acute systolic heart failure  - patient presented to OSH with acute volume overload, EF 35% on echo  Nuclear medicine Lexiscan stress test from 7/1/2024 (per chart review), low EF "mild transient ischemia for inferoapical scar but no evidence of Lexiscan induced ischemia at this time".     - volume overload appears improved with dialysis  Patient was weaned off supplemental oxygen in his currently doing well on room air    Plan:  - Cardiology consulted, appreciate assistance.  Continue dialysis per Nephrology        Hyperlipidemia  - Continue " statin      Coronary artery disease  - Known CAD s/p CABG. Had elevated troponins at OSH, evaluated by cardiology. [place] to be Type II NSTEMI. SPECT at OSH reportedly negative    Plan:  - aspirin, statin      Essential hypertension  Currently normotensive    Plan:  - continue current regimen  - will defer to renal for overall BP changes  - home regimen included clonidine. Per chart review, he was well controlled on current regimen. Will discontinue for now. Patient had no rebound hypertension and blood pressures overall remained stable    Renal/  ESRD (end stage renal disease)  Receives dialysis MWF    Plan  - consult to nephrology for inpatient dialysis management    ID  Bacteremia        Oncology  Anemia in chronic kidney disease, on chronic dialysis  - hgb appears stable    Endocrine  Type 2 diabetes mellitus, without long-term current use of insulin  - A1c 5.6 on 7/1  - glucose checks          Orthopedic  BKA stump complication  - skin breakdown noted on bilateral stumps, possible diabetic ulceration.   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] 07/03/2024 Yes    Acute hypoxic respiratory failure [J96.01] 07/03/2024 Yes    Anemia in chronic kidney disease, on chronic dialysis [N18.6, D63.1, Z99.2] 11/29/2021 Not Applicable    ACP (advance care planning) [Z71.89] 11/22/2021 Not Applicable    Hyperlipidemia [E78.5]  Yes    Coronary artery disease [I25.10]  Yes    ESRD (end stage renal disease) [N18.6] 07/05/2021 Yes    Essential hypertension [I10] 07/05/2021 Yes    Type 2 diabetes mellitus,  "without long-term current use of insulin [E11.9] 07/05/2021 Yes      Problems Resolved During this Admission:       Discharged Condition: good    Disposition: Home or Self Care    Follow Up:   Follow-up Information       Richie Cherry MD Follow up on 7/11/2024.    Specialty: General Surgery  Why: at 2:20pm; PCP hospital follow up appointment  Contact information:  [address] [address] [address] [phone]               [facility] Follow up.    Why: will provide IV meropenem for [facility]  Contact information:  [address]  [facility] [id number]                         Patient Instructions:   No discharge procedures on file.    Significant Diagnostic Studies: Labs: BMP: No results for input(s): "GLU", "NA", "K", "CL", "CO2", "BUN", "CREATININE", "CALCIUM", "MG" in the last 48 hours., CMP No results for input(s): "NA", "K", "CL", "CO2", "GLU", "BUN", "CREATININE", "CALCIUM", "PROT", "ALBUMIN", "BILITOT", "ALKPHOS", "AST", "ALT", "ANIONGAP", "ESTGFRAFRICA", "EGFRNONAA" in the last 48 hours., and All labs within the past 24 hours have been reviewed    Pending Diagnostic Studies:       None           Medications:  Reconciled Home Medications:      Medication List        START taking these medications      amLODIPine 10 MG tablet  Commonly known as: NORVASC  Take 1 tablet (10 mg total) by mouth once daily.            CONTINUE taking these medications      acetaminophen 325 MG tablet  Commonly known as: TYLENOL  Take 1 tablet (325 mg total) by mouth every 6 (six) hours as needed for Pain.     aspirin 81 MG EC tablet  Commonly known as: ECOTRIN  Take 1 tablet (81 mg total) by mouth every other day.     atorvastatin 80 MG tablet  Commonly known as: LIPITOR  Take 1 tablet (80 mg total) by mouth every morning.     calcitRIOL 0.5 MCG Cap  Commonly known as: ROCALTROL  Take 1 capsule (0.5 mcg total) by mouth once daily.     carvediloL 25 MG tablet  Commonly known as: " COREG  Take 1 tablet (25 mg total) by mouth 2 (two) times daily.     clopidogreL 75 mg tablet  Commonly known as: PLAVIX  Take 1 tablet (75 mg total) by mouth once daily.     famotidine 40 MG tablet  Commonly known as: PEPCID  Take 1 tablet (40 mg total) by mouth every evening.     gabapentin 300 MG capsule  Commonly known as: NEURONTIN  Take 1 capsule (300 mg total) by mouth every evening.     minoxidiL 2.5 MG tablet  Commonly known as: LONITEN  Take 2 tablets (5 mg total) by mouth 2 (two) times daily.     polyethylene glycol 17 gram Pwpk  Commonly known as: GLYCOLAX  Take 17 g by mouth once daily.     sevelamer carbonate 800 mg Tab  Commonly known as: RENVELA  Take 1 tablet (800 mg total) by mouth 3 (three) times daily with meals.              Indwelling Lines/Drains at time of discharge:   Lines/Drains/Airways       Drain  Duration                  Hemodialysis AV Fistula -- days         Hemodialysis AV Fistula Left upper arm -- days                    Time spent on the discharge of patient: 30 minutes         Bert Moore MD  Department of [facility] Medicine  [facility]

## 2024-07-16 NOTE — ASSESSMENT & PLAN NOTE
Currently normotensive    Plan:  - continue current regimen  - will defer to renal for overall BP changes  - home regimen included clonidine. Per chart review, he was well controlled on current regimen. Will discontinue for now. Patient had no rebound hypertension and blood pressures overall remained stable

## 2024-07-16 NOTE — PHYSICIAN QUERY
Due to the conflicting clinical picture, please clinically validate the Acute hypoxic respiratory failure.    The respiratory condition has been ruled out

## 2024-07-16 NOTE — ASSESSMENT & PLAN NOTE
Bacteremia, unclear source    6/30 blood cultures with with Acinetobacter calcoaceticus-baumannii complex. Staphylococcus species and corynebacterium likely contaminant.  Reviewed sensitivities from outside hospital  Transferred to [place] for concern of mitral valve vegetation.  However KURTIS findings did not show vegetation    Patient remains afebrile and hemodynamically stable    Plan:  -vancomycin/cefepime change to meropenem based on culture results.  Vancomycin has been discontinued  -Repeat blood cultures no growth     Patient to continue meropenem per ID recommendations. Treatment duration 2 weeks (stop date 7/17)   At discharge patient will continue course of Merem, dosing with dialysis sessions.

## 2024-07-16 NOTE — ASSESSMENT & PLAN NOTE
- Known CAD s/p CABG. Had elevated troponins at OSH, evaluated by cardiology. [place] to be Type II NSTEMI. SPECT at OSH reportedly negative    Plan:  - aspirin, statin

## 2024-12-28 NOTE — ASSESSMENT & PLAN NOTE
- skin breakdown noted on bilateral stumps, possible diabetic ulceration.   - no obvious signs of infection  - wound care consult placed     Influenza Vaccination/Apixaban/Eliquis